# Patient Record
Sex: FEMALE | Race: OTHER | Employment: UNEMPLOYED
[De-identification: names, ages, dates, MRNs, and addresses within clinical notes are randomized per-mention and may not be internally consistent; named-entity substitution may affect disease eponyms.]

---

## 2023-12-04 ENCOUNTER — APPOINTMENT (OUTPATIENT)
Facility: HOSPITAL | Age: 68
End: 2023-12-04
Payer: MEDICARE

## 2023-12-04 ENCOUNTER — HOSPITAL ENCOUNTER (INPATIENT)
Facility: HOSPITAL | Age: 68
LOS: 5 days | Discharge: HOME | End: 2023-12-09
Attending: EMERGENCY MEDICINE | Admitting: INTERNAL MEDICINE
Payer: MEDICARE

## 2023-12-04 DIAGNOSIS — N17.9 ACUTE RENAL FAILURE SUPERIMPOSED ON STAGE 4 CHRONIC KIDNEY DISEASE, UNSPECIFIED ACUTE RENAL FAILURE TYPE (HCC): Primary | ICD-10-CM

## 2023-12-04 DIAGNOSIS — N18.4 ACUTE RENAL FAILURE SUPERIMPOSED ON STAGE 4 CHRONIC KIDNEY DISEASE, UNSPECIFIED ACUTE RENAL FAILURE TYPE (HCC): Primary | ICD-10-CM

## 2023-12-04 PROBLEM — N19 UREMIA: Status: ACTIVE | Noted: 2023-12-04

## 2023-12-04 PROBLEM — E11.8 DISORDER ASSOCIATED WITH TYPE 2 DIABETES MELLITUS (HCC): Status: ACTIVE | Noted: 2023-12-04

## 2023-12-04 PROBLEM — J30.9 ALLERGIC RHINITIS: Status: ACTIVE | Noted: 2023-12-04

## 2023-12-04 PROBLEM — I10 ESSENTIAL HYPERTENSION: Status: ACTIVE | Noted: 2023-12-04

## 2023-12-04 PROBLEM — E11.9 DIABETES MELLITUS (HCC): Status: ACTIVE | Noted: 2023-12-04

## 2023-12-04 PROBLEM — M19.91 PRIMARY LOCALIZED OSTEOARTHRITIS: Status: ACTIVE | Noted: 2023-12-04

## 2023-12-04 PROBLEM — N20.0 NEPHROLITHIASIS: Status: ACTIVE | Noted: 2023-12-04

## 2023-12-04 PROBLEM — E01.2 COLLOID GOITER: Status: ACTIVE | Noted: 2023-12-04

## 2023-12-04 LAB
ALBUMIN SERPL-MCNC: 3.7 G/DL (ref 3.4–5)
ALBUMIN/GLOB SERPL: 1.1 (ref 0.8–1.7)
ALP SERPL-CCNC: 48 U/L (ref 45–117)
ALT SERPL-CCNC: 9 U/L (ref 13–56)
ANION GAP SERPL CALC-SCNC: 11 MMOL/L (ref 3–18)
AST SERPL-CCNC: 12 U/L (ref 10–38)
BASOPHILS # BLD: 0.1 K/UL (ref 0–0.1)
BASOPHILS NFR BLD: 0 % (ref 0–2)
BILIRUB SERPL-MCNC: 0.4 MG/DL (ref 0.2–1)
BUN SERPL-MCNC: 64 MG/DL (ref 7–18)
BUN/CREAT SERPL: 11 (ref 12–20)
CALCIUM SERPL-MCNC: 9.9 MG/DL (ref 8.5–10.1)
CHLORIDE SERPL-SCNC: 105 MMOL/L (ref 100–111)
CO2 SERPL-SCNC: 23 MMOL/L (ref 21–32)
CREAT SERPL-MCNC: 5.66 MG/DL (ref 0.6–1.3)
DIFFERENTIAL METHOD BLD: ABNORMAL
EOSINOPHIL # BLD: 0.6 K/UL (ref 0–0.4)
EOSINOPHIL NFR BLD: 5 % (ref 0–5)
ERYTHROCYTE [DISTWIDTH] IN BLOOD BY AUTOMATED COUNT: 12.2 % (ref 11.6–14.5)
GLOBULIN SER CALC-MCNC: 3.4 G/DL (ref 2–4)
GLUCOSE SERPL-MCNC: 163 MG/DL (ref 74–99)
HCT VFR BLD AUTO: 32.9 % (ref 35–45)
HGB BLD-MCNC: 11 G/DL (ref 12–16)
IMM GRANULOCYTES # BLD AUTO: 0 K/UL (ref 0–0.04)
IMM GRANULOCYTES NFR BLD AUTO: 0 % (ref 0–0.5)
LYMPHOCYTES # BLD: 3.3 K/UL (ref 0.9–3.6)
LYMPHOCYTES NFR BLD: 27 % (ref 21–52)
MCH RBC QN AUTO: 31.1 PG (ref 24–34)
MCHC RBC AUTO-ENTMCNC: 33.4 G/DL (ref 31–37)
MCV RBC AUTO: 92.9 FL (ref 78–100)
MONOCYTES # BLD: 0.8 K/UL (ref 0.05–1.2)
MONOCYTES NFR BLD: 7 % (ref 3–10)
NEUTS SEG # BLD: 7.3 K/UL (ref 1.8–8)
NEUTS SEG NFR BLD: 61 % (ref 40–73)
NRBC # BLD: 0 K/UL (ref 0–0.01)
NRBC BLD-RTO: 0 PER 100 WBC
PLATELET # BLD AUTO: 329 K/UL (ref 135–420)
PMV BLD AUTO: 10.1 FL (ref 9.2–11.8)
POTASSIUM SERPL-SCNC: 3.9 MMOL/L (ref 3.5–5.5)
PROT SERPL-MCNC: 7.1 G/DL (ref 6.4–8.2)
RBC # BLD AUTO: 3.54 M/UL (ref 4.2–5.3)
SODIUM SERPL-SCNC: 139 MMOL/L (ref 136–145)
WBC # BLD AUTO: 12 K/UL (ref 4.6–13.2)

## 2023-12-04 PROCEDURE — 82746 ASSAY OF FOLIC ACID SERUM: CPT

## 2023-12-04 PROCEDURE — 99285 EMERGENCY DEPT VISIT HI MDM: CPT

## 2023-12-04 PROCEDURE — 71045 X-RAY EXAM CHEST 1 VIEW: CPT

## 2023-12-04 PROCEDURE — 82607 VITAMIN B-12: CPT

## 2023-12-04 PROCEDURE — 83550 IRON BINDING TEST: CPT

## 2023-12-04 PROCEDURE — 2500000003 HC RX 250 WO HCPCS: Performed by: EMERGENCY MEDICINE

## 2023-12-04 PROCEDURE — 1100000003 HC PRIVATE W/ TELEMETRY

## 2023-12-04 PROCEDURE — 2580000003 HC RX 258: Performed by: INTERNAL MEDICINE

## 2023-12-04 PROCEDURE — 85025 COMPLETE CBC W/AUTO DIFF WBC: CPT

## 2023-12-04 PROCEDURE — 6370000000 HC RX 637 (ALT 250 FOR IP): Performed by: INTERNAL MEDICINE

## 2023-12-04 PROCEDURE — 83540 ASSAY OF IRON: CPT

## 2023-12-04 PROCEDURE — 96374 THER/PROPH/DIAG INJ IV PUSH: CPT

## 2023-12-04 PROCEDURE — 80053 COMPREHEN METABOLIC PANEL: CPT

## 2023-12-04 PROCEDURE — 83036 HEMOGLOBIN GLYCOSYLATED A1C: CPT

## 2023-12-04 RX ORDER — SODIUM CHLORIDE 9 MG/ML
INJECTION, SOLUTION INTRAVENOUS PRN
Status: DISCONTINUED | OUTPATIENT
Start: 2023-12-04 | End: 2023-12-09 | Stop reason: HOSPADM

## 2023-12-04 RX ORDER — HEPARIN SODIUM 5000 [USP'U]/ML
5000 INJECTION, SOLUTION INTRAVENOUS; SUBCUTANEOUS EVERY 8 HOURS SCHEDULED
Status: DISCONTINUED | OUTPATIENT
Start: 2023-12-04 | End: 2023-12-05

## 2023-12-04 RX ORDER — BUMETANIDE 0.25 MG/ML
1 INJECTION INTRAMUSCULAR; INTRAVENOUS
Status: COMPLETED | OUTPATIENT
Start: 2023-12-04 | End: 2023-12-04

## 2023-12-04 RX ORDER — BUMETANIDE 0.25 MG/ML
1 INJECTION INTRAMUSCULAR; INTRAVENOUS EVERY 12 HOURS
Status: DISCONTINUED | OUTPATIENT
Start: 2023-12-05 | End: 2023-12-06

## 2023-12-04 RX ORDER — POLYETHYLENE GLYCOL 3350 17 G/17G
17 POWDER, FOR SOLUTION ORAL DAILY PRN
Status: DISCONTINUED | OUTPATIENT
Start: 2023-12-04 | End: 2023-12-09 | Stop reason: HOSPADM

## 2023-12-04 RX ORDER — ONDANSETRON 2 MG/ML
4 INJECTION INTRAMUSCULAR; INTRAVENOUS EVERY 6 HOURS PRN
Status: DISCONTINUED | OUTPATIENT
Start: 2023-12-04 | End: 2023-12-09 | Stop reason: HOSPADM

## 2023-12-04 RX ORDER — ACETAMINOPHEN 650 MG/1
650 SUPPOSITORY RECTAL EVERY 6 HOURS PRN
Status: DISCONTINUED | OUTPATIENT
Start: 2023-12-04 | End: 2023-12-09 | Stop reason: HOSPADM

## 2023-12-04 RX ORDER — INSULIN LISPRO 100 [IU]/ML
0.05 INJECTION, SOLUTION INTRAVENOUS; SUBCUTANEOUS
Status: DISCONTINUED | OUTPATIENT
Start: 2023-12-05 | End: 2023-12-05

## 2023-12-04 RX ORDER — SODIUM CHLORIDE 0.9 % (FLUSH) 0.9 %
5-40 SYRINGE (ML) INJECTION EVERY 12 HOURS SCHEDULED
Status: DISCONTINUED | OUTPATIENT
Start: 2023-12-04 | End: 2023-12-09 | Stop reason: HOSPADM

## 2023-12-04 RX ORDER — AMLODIPINE BESYLATE 5 MG/1
10 TABLET ORAL
Status: DISCONTINUED | OUTPATIENT
Start: 2023-12-04 | End: 2023-12-04

## 2023-12-04 RX ORDER — SODIUM CHLORIDE 0.9 % (FLUSH) 0.9 %
5-40 SYRINGE (ML) INJECTION PRN
Status: DISCONTINUED | OUTPATIENT
Start: 2023-12-04 | End: 2023-12-09 | Stop reason: HOSPADM

## 2023-12-04 RX ORDER — ACETAMINOPHEN 325 MG/1
650 TABLET ORAL EVERY 6 HOURS PRN
Status: DISCONTINUED | OUTPATIENT
Start: 2023-12-04 | End: 2023-12-09 | Stop reason: HOSPADM

## 2023-12-04 RX ORDER — DEXTROSE MONOHYDRATE 100 MG/ML
INJECTION, SOLUTION INTRAVENOUS CONTINUOUS PRN
Status: DISCONTINUED | OUTPATIENT
Start: 2023-12-04 | End: 2023-12-09 | Stop reason: HOSPADM

## 2023-12-04 RX ORDER — ATENOLOL 50 MG/1
50 TABLET ORAL 2 TIMES DAILY
Status: ON HOLD | COMMUNITY
End: 2023-12-09 | Stop reason: HOSPADM

## 2023-12-04 RX ORDER — ATENOLOL 50 MG/1
50 TABLET ORAL 2 TIMES DAILY
Status: DISCONTINUED | OUTPATIENT
Start: 2023-12-04 | End: 2023-12-09 | Stop reason: HOSPADM

## 2023-12-04 RX ORDER — ONDANSETRON 4 MG/1
4 TABLET, ORALLY DISINTEGRATING ORAL EVERY 8 HOURS PRN
Status: DISCONTINUED | OUTPATIENT
Start: 2023-12-04 | End: 2023-12-09 | Stop reason: HOSPADM

## 2023-12-04 RX ADMIN — SODIUM CHLORIDE, PRESERVATIVE FREE 10 ML: 5 INJECTION INTRAVENOUS at 23:04

## 2023-12-04 RX ADMIN — ATENOLOL 50 MG: 50 TABLET ORAL at 23:04

## 2023-12-04 RX ADMIN — BUMETANIDE 1 MG: 0.25 INJECTION INTRAMUSCULAR; INTRAVENOUS at 17:26

## 2023-12-04 ASSESSMENT — PAIN - FUNCTIONAL ASSESSMENT: PAIN_FUNCTIONAL_ASSESSMENT: NONE - DENIES PAIN

## 2023-12-04 NOTE — ED PROVIDER NOTES
THE [facility] EMERGENCY DEPT  EMERGENCY DEPARTMENT HISTORY AND PHYSICAL EXAM      Date: 12/4/2023  Patient Name: Jen Vogt  MRN: [id number]  [address] [place] 1955  Date of evaluation: 12/4/2023  Provider: Saint Hang, MD   Note Started: 5:39 PM EST 12/4/23    HISTORY OF PRESENT ILLNESS     Chief Complaint   Patient presents with    Chronic Kidney Disease       History Provided By: Patient    HPI: Jen Vogt is a 79 y.o. female sent by her nephrologist for worsening renal failure. Per patient as well as Dr. Jazmyne Dailey who sent patient in, patient has been having worsening kidney function for about a year. Has been followed. States that she has been drinking given fluid is mostly drinking and making good urine. Denies any pain, dysuria, hematuria, retention, fevers. Was told that she would need admission for dialysis    PAST MEDICAL HISTORY   Past Medical History:  No past medical history on file. Past Surgical History:  No past surgical history on file. Family History:  No family history on file. Social History: Allergies: Allergies   Allergen Reactions    Metformin And Related Other (See Comments)     \"Lactic acidosis\"       PCP: No primary care provider on file. Current Meds:   No current facility-administered medications for this encounter. No current outpatient medications on file. Social Determinants of Health:   Social Determinants of Health     Tobacco Use: Not on file   Alcohol Use: Not on file   Financial Resource Strain: Not on file   Food Insecurity: Not on file   Transportation Needs: Not on file   Physical Activity: Not on file   Stress: Not on file   Social Connections: Not on file   Intimate Partner Violence: Not on file   Depression: Not on file   Housing Stability: Not on file   Interpersonal Safety: Not on file   Utilities: Not on file       PHYSICAL EXAM   Physical Exam  Vitals and nursing note reviewed. Constitutional:       Appearance: Normal appearance.    HENT:

## 2023-12-04 NOTE — ED TRIAGE NOTES
Pt wheeled to triage c/o being sent by Dr. Marii Cee on the 3rd floor for admission to start dialysis. Pt does not have any HD access at this time.

## 2023-12-05 ENCOUNTER — APPOINTMENT (OUTPATIENT)
Facility: HOSPITAL | Age: 68
End: 2023-12-05
Payer: MEDICARE

## 2023-12-05 PROBLEM — Z99.2 CKD (CHRONIC KIDNEY DISEASE) STAGE V REQUIRING CHRONIC DIALYSIS (HCC): Status: ACTIVE | Noted: 2023-12-05

## 2023-12-05 PROBLEM — N18.6 CKD (CHRONIC KIDNEY DISEASE) STAGE V REQUIRING CHRONIC DIALYSIS (HCC): Status: ACTIVE | Noted: 2023-12-05

## 2023-12-05 LAB
25(OH)D3 SERPL-MCNC: 42.2 NG/ML (ref 30–100)
ALBUMIN SERPL-MCNC: 3.3 G/DL (ref 3.4–5)
ALBUMIN/GLOB SERPL: 1 (ref 0.8–1.7)
ALP SERPL-CCNC: 45 U/L (ref 45–117)
ALT SERPL-CCNC: 10 U/L (ref 13–56)
ANION GAP SERPL CALC-SCNC: 11 MMOL/L (ref 3–18)
APPEARANCE UR: ABNORMAL
AST SERPL-CCNC: 9 U/L (ref 10–38)
BACTERIA URNS QL MICRO: ABNORMAL /HPF
BILIRUB SERPL-MCNC: 0.3 MG/DL (ref 0.2–1)
BILIRUB UR QL: NEGATIVE
BUN SERPL-MCNC: 68 MG/DL (ref 7–18)
BUN/CREAT SERPL: 12 (ref 12–20)
CALCIUM SERPL-MCNC: 9.3 MG/DL (ref 8.5–10.1)
CALCIUM SERPL-MCNC: 9.5 MG/DL (ref 8.5–10.1)
CHLORIDE SERPL-SCNC: 107 MMOL/L (ref 100–111)
CO2 SERPL-SCNC: 23 MMOL/L (ref 21–32)
COLOR UR: YELLOW
CREAT SERPL-MCNC: 5.58 MG/DL (ref 0.6–1.3)
EKG ATRIAL RATE: 84 BPM
EKG DIAGNOSIS: NORMAL
EKG P AXIS: 37 DEGREES
EKG P-R INTERVAL: 286 MS
EKG Q-T INTERVAL: 404 MS
EKG QRS DURATION: 84 MS
EKG QTC CALCULATION (BAZETT): 477 MS
EKG R AXIS: 37 DEGREES
EKG T AXIS: 78 DEGREES
EKG VENTRICULAR RATE: 84 BPM
EPITH CASTS URNS QL MICRO: ABNORMAL /LPF (ref 0–5)
ERYTHROCYTE [DISTWIDTH] IN BLOOD BY AUTOMATED COUNT: 12.2 % (ref 11.6–14.5)
EST. AVERAGE GLUCOSE BLD GHB EST-MCNC: 169 MG/DL
FOLATE SERPL-MCNC: 18 NG/ML (ref 3.1–17.5)
GLOBULIN SER CALC-MCNC: 3.2 G/DL (ref 2–4)
GLUCOSE BLD STRIP.AUTO-MCNC: 163 MG/DL (ref 70–110)
GLUCOSE BLD STRIP.AUTO-MCNC: 172 MG/DL (ref 70–110)
GLUCOSE BLD STRIP.AUTO-MCNC: 182 MG/DL (ref 70–110)
GLUCOSE SERPL-MCNC: 178 MG/DL (ref 74–99)
GLUCOSE UR STRIP.AUTO-MCNC: NEGATIVE MG/DL
HBA1C MFR BLD: 7.5 % (ref 4.2–5.6)
HCT VFR BLD AUTO: 30.1 % (ref 35–45)
HGB BLD-MCNC: 10.3 G/DL (ref 12–16)
HGB UR QL STRIP: NEGATIVE
INR PPP: 1 (ref 0.9–1.1)
IRON SATN MFR SERPL: 30 % (ref 20–50)
IRON SERPL-MCNC: 71 UG/DL (ref 50–175)
KETONES UR QL STRIP.AUTO: NEGATIVE MG/DL
LEUKOCYTE ESTERASE UR QL STRIP.AUTO: NEGATIVE
MAGNESIUM SERPL-MCNC: 2.5 MG/DL (ref 1.6–2.6)
MCH RBC QN AUTO: 31.3 PG (ref 24–34)
MCHC RBC AUTO-ENTMCNC: 34.2 G/DL (ref 31–37)
MCV RBC AUTO: 91.5 FL (ref 78–100)
NITRITE UR QL STRIP.AUTO: NEGATIVE
NRBC # BLD: 0 K/UL (ref 0–0.01)
NRBC BLD-RTO: 0 PER 100 WBC
PH UR STRIP: 5.5 (ref 5–8)
PHOSPHATE SERPL-MCNC: 6.3 MG/DL (ref 2.5–4.9)
PLATELET # BLD AUTO: 331 K/UL (ref 135–420)
PMV BLD AUTO: 10.7 FL (ref 9.2–11.8)
POTASSIUM SERPL-SCNC: 3.5 MMOL/L (ref 3.5–5.5)
PROT SERPL-MCNC: 6.5 G/DL (ref 6.4–8.2)
PROT UR STRIP-MCNC: 300 MG/DL
PROTHROMBIN TIME: 13.5 SEC (ref 11.9–14.7)
PTH-INTACT SERPL-MCNC: 151.6 PG/ML (ref 18.4–88)
RBC # BLD AUTO: 3.29 M/UL (ref 4.2–5.3)
RBC #/AREA URNS HPF: ABNORMAL /HPF (ref 0–5)
SODIUM SERPL-SCNC: 141 MMOL/L (ref 136–145)
SP GR UR REFRACTOMETRY: 1.01 (ref 1–1.03)
TIBC SERPL-MCNC: 237 UG/DL (ref 250–450)
TROPONIN I SERPL HS-MCNC: 12 NG/L (ref 0–54)
TROPONIN I SERPL HS-MCNC: 13 NG/L (ref 0–54)
TROPONIN I SERPL HS-MCNC: 18 NG/L (ref 0–54)
TSH SERPL DL<=0.05 MIU/L-ACNC: 1.48 UIU/ML (ref 0.36–3.74)
UROBILINOGEN UR QL STRIP.AUTO: 0.2 EU/DL (ref 0.2–1)
VIT B12 SERPL-MCNC: 822 PG/ML (ref 211–911)
WBC # BLD AUTO: 9.6 K/UL (ref 4.6–13.2)
WBC URNS QL MICRO: ABNORMAL /HPF (ref 0–5)

## 2023-12-05 PROCEDURE — 6370000000 HC RX 637 (ALT 250 FOR IP): Performed by: HOSPITALIST

## 2023-12-05 PROCEDURE — 2580000003 HC RX 258: Performed by: INTERNAL MEDICINE

## 2023-12-05 PROCEDURE — 97162 PT EVAL MOD COMPLEX 30 MIN: CPT

## 2023-12-05 PROCEDURE — 6360000002 HC RX W HCPCS

## 2023-12-05 PROCEDURE — 36415 COLL VENOUS BLD VENIPUNCTURE: CPT

## 2023-12-05 PROCEDURE — 76937 US GUIDE VASCULAR ACCESS: CPT

## 2023-12-05 PROCEDURE — 87340 HEPATITIS B SURFACE AG IA: CPT

## 2023-12-05 PROCEDURE — 2500000003 HC RX 250 WO HCPCS

## 2023-12-05 PROCEDURE — 2580000003 HC RX 258: Performed by: STUDENT IN AN ORGANIZED HEALTH CARE EDUCATION/TRAINING PROGRAM

## 2023-12-05 PROCEDURE — 2500000003 HC RX 250 WO HCPCS: Performed by: INTERNAL MEDICINE

## 2023-12-05 PROCEDURE — 83735 ASSAY OF MAGNESIUM: CPT

## 2023-12-05 PROCEDURE — 83970 ASSAY OF PARATHORMONE: CPT

## 2023-12-05 PROCEDURE — 5A1D70Z PERFORMANCE OF URINARY FILTRATION, INTERMITTENT, LESS THAN 6 HOURS PER DAY: ICD-10-PCS | Performed by: STUDENT IN AN ORGANIZED HEALTH CARE EDUCATION/TRAINING PROGRAM

## 2023-12-05 PROCEDURE — 84100 ASSAY OF PHOSPHORUS: CPT

## 2023-12-05 PROCEDURE — 02H633Z INSERTION OF INFUSION DEVICE INTO RIGHT ATRIUM, PERCUTANEOUS APPROACH: ICD-10-PCS | Performed by: STUDENT IN AN ORGANIZED HEALTH CARE EDUCATION/TRAINING PROGRAM

## 2023-12-05 PROCEDURE — 84443 ASSAY THYROID STIM HORMONE: CPT

## 2023-12-05 PROCEDURE — 0JH63XZ INSERTION OF TUNNELED VASCULAR ACCESS DEVICE INTO CHEST SUBCUTANEOUS TISSUE AND FASCIA, PERCUTANEOUS APPROACH: ICD-10-PCS | Performed by: STUDENT IN AN ORGANIZED HEALTH CARE EDUCATION/TRAINING PROGRAM

## 2023-12-05 PROCEDURE — 86803 HEPATITIS C AB TEST: CPT

## 2023-12-05 PROCEDURE — 6360000002 HC RX W HCPCS: Performed by: STUDENT IN AN ORGANIZED HEALTH CARE EDUCATION/TRAINING PROGRAM

## 2023-12-05 PROCEDURE — 1100000003 HC PRIVATE W/ TELEMETRY

## 2023-12-05 PROCEDURE — 84484 ASSAY OF TROPONIN QUANT: CPT

## 2023-12-05 PROCEDURE — 99155 MOD SED OTH PHYS/QHP <5 YRS: CPT

## 2023-12-05 PROCEDURE — 82962 GLUCOSE BLOOD TEST: CPT

## 2023-12-05 PROCEDURE — 85610 PROTHROMBIN TIME: CPT

## 2023-12-05 PROCEDURE — 81001 URINALYSIS AUTO W/SCOPE: CPT

## 2023-12-05 PROCEDURE — 6360000002 HC RX W HCPCS: Performed by: INTERNAL MEDICINE

## 2023-12-05 PROCEDURE — 82306 VITAMIN D 25 HYDROXY: CPT

## 2023-12-05 PROCEDURE — 6370000000 HC RX 637 (ALT 250 FOR IP): Performed by: INTERNAL MEDICINE

## 2023-12-05 PROCEDURE — 80053 COMPREHEN METABOLIC PANEL: CPT

## 2023-12-05 PROCEDURE — 6370000000 HC RX 637 (ALT 250 FOR IP): Performed by: STUDENT IN AN ORGANIZED HEALTH CARE EDUCATION/TRAINING PROGRAM

## 2023-12-05 PROCEDURE — 85027 COMPLETE CBC AUTOMATED: CPT

## 2023-12-05 RX ORDER — LIDOCAINE HYDROCHLORIDE 10 MG/ML
20 INJECTION, SOLUTION INFILTRATION; PERINEURAL ONCE
Status: DISCONTINUED | OUTPATIENT
Start: 2023-12-05 | End: 2023-12-09 | Stop reason: HOSPADM

## 2023-12-05 RX ORDER — HEPARIN SODIUM 200 [USP'U]/100ML
INJECTION, SOLUTION INTRAVENOUS CONTINUOUS PRN
Status: COMPLETED | OUTPATIENT
Start: 2023-12-05 | End: 2023-12-05

## 2023-12-05 RX ORDER — AMLODIPINE BESYLATE 5 MG/1
10 TABLET ORAL DAILY
Status: DISCONTINUED | OUTPATIENT
Start: 2023-12-05 | End: 2023-12-06

## 2023-12-05 RX ORDER — LIDOCAINE HYDROCHLORIDE 10 MG/ML
INJECTION, SOLUTION EPIDURAL; INFILTRATION; INTRACAUDAL; PERINEURAL PRN
Status: COMPLETED | OUTPATIENT
Start: 2023-12-05 | End: 2023-12-05

## 2023-12-05 RX ORDER — INSULIN LISPRO 100 [IU]/ML
0-8 INJECTION, SOLUTION INTRAVENOUS; SUBCUTANEOUS
Status: DISCONTINUED | OUTPATIENT
Start: 2023-12-05 | End: 2023-12-09 | Stop reason: HOSPADM

## 2023-12-05 RX ORDER — LIDOCAINE HYDROCHLORIDE AND EPINEPHRINE BITARTRATE 20; .01 MG/ML; MG/ML
INJECTION, SOLUTION SUBCUTANEOUS PRN
Status: COMPLETED | OUTPATIENT
Start: 2023-12-05 | End: 2023-12-05

## 2023-12-05 RX ORDER — ONDANSETRON 2 MG/ML
INJECTION INTRAMUSCULAR; INTRAVENOUS PRN
Status: COMPLETED | OUTPATIENT
Start: 2023-12-05 | End: 2023-12-05

## 2023-12-05 RX ORDER — HEPARIN SODIUM 1000 [USP'U]/ML
INJECTION, SOLUTION INTRAVENOUS; SUBCUTANEOUS PRN
Status: COMPLETED | OUTPATIENT
Start: 2023-12-05 | End: 2023-12-05

## 2023-12-05 RX ORDER — FAMOTIDINE 20 MG/1
20 TABLET, FILM COATED ORAL EVERY 24 HOURS
Status: DISCONTINUED | OUTPATIENT
Start: 2023-12-05 | End: 2023-12-09 | Stop reason: HOSPADM

## 2023-12-05 RX ORDER — LIDOCAINE HYDROCHLORIDE AND EPINEPHRINE 10; 10 MG/ML; UG/ML
20 INJECTION, SOLUTION INFILTRATION; PERINEURAL ONCE
Status: DISCONTINUED | OUTPATIENT
Start: 2023-12-05 | End: 2023-12-09 | Stop reason: HOSPADM

## 2023-12-05 RX ORDER — HEPARIN SODIUM 200 [USP'U]/100ML
1000 INJECTION, SOLUTION INTRAVENOUS ONCE
Status: DISCONTINUED | OUTPATIENT
Start: 2023-12-05 | End: 2023-12-09 | Stop reason: HOSPADM

## 2023-12-05 RX ORDER — HEPARIN SODIUM 1000 [USP'U]/ML
4000 INJECTION, SOLUTION INTRAVENOUS; SUBCUTANEOUS
Status: COMPLETED | OUTPATIENT
Start: 2023-12-05 | End: 2023-12-09

## 2023-12-05 RX ORDER — FENTANYL CITRATE 50 UG/ML
INJECTION, SOLUTION INTRAMUSCULAR; INTRAVENOUS PRN
Status: COMPLETED | OUTPATIENT
Start: 2023-12-05 | End: 2023-12-05

## 2023-12-05 RX ORDER — HYDRALAZINE HYDROCHLORIDE 10 MG/1
10 TABLET, FILM COATED ORAL EVERY 8 HOURS SCHEDULED
Status: DISCONTINUED | OUTPATIENT
Start: 2023-12-05 | End: 2023-12-06

## 2023-12-05 RX ORDER — MIDAZOLAM HYDROCHLORIDE 2 MG/2ML
INJECTION, SOLUTION INTRAMUSCULAR; INTRAVENOUS PRN
Status: COMPLETED | OUTPATIENT
Start: 2023-12-05 | End: 2023-12-05

## 2023-12-05 RX ORDER — INSULIN LISPRO 100 [IU]/ML
0-4 INJECTION, SOLUTION INTRAVENOUS; SUBCUTANEOUS NIGHTLY
Status: DISCONTINUED | OUTPATIENT
Start: 2023-12-05 | End: 2023-12-09 | Stop reason: HOSPADM

## 2023-12-05 RX ORDER — HEPARIN SODIUM 5000 [USP'U]/ML
5000 INJECTION, SOLUTION INTRAVENOUS; SUBCUTANEOUS EVERY 8 HOURS SCHEDULED
Status: DISCONTINUED | OUTPATIENT
Start: 2023-12-06 | End: 2023-12-09 | Stop reason: HOSPADM

## 2023-12-05 RX ADMIN — ATENOLOL 50 MG: 50 TABLET ORAL at 08:56

## 2023-12-05 RX ADMIN — SODIUM CHLORIDE, PRESERVATIVE FREE 10 ML: 5 INJECTION INTRAVENOUS at 21:30

## 2023-12-05 RX ADMIN — BUMETANIDE 1 MG: 0.25 INJECTION INTRAMUSCULAR; INTRAVENOUS at 16:43

## 2023-12-05 RX ADMIN — HEPARIN SODIUM 500 ML: 200 INJECTION, SOLUTION INTRAVENOUS at 12:17

## 2023-12-05 RX ADMIN — ONDANSETRON 4 MG: 2 INJECTION INTRAMUSCULAR; INTRAVENOUS at 21:38

## 2023-12-05 RX ADMIN — AMLODIPINE BESYLATE 10 MG: 5 TABLET ORAL at 16:43

## 2023-12-05 RX ADMIN — LIDOCAINE HYDROCHLORIDE AND EPINEPHRINE 10 ML: 20; 10 INJECTION, SOLUTION INFILTRATION; PERINEURAL at 12:29

## 2023-12-05 RX ADMIN — HYDRALAZINE HYDROCHLORIDE 10 MG: 10 TABLET, FILM COATED ORAL at 13:24

## 2023-12-05 RX ADMIN — FENTANYL CITRATE 25 MCG: 50 INJECTION, SOLUTION INTRAMUSCULAR; INTRAVENOUS at 12:11

## 2023-12-05 RX ADMIN — SODIUM CHLORIDE, PRESERVATIVE FREE 10 ML: 5 INJECTION INTRAVENOUS at 08:57

## 2023-12-05 RX ADMIN — FENTANYL CITRATE 50 MCG: 50 INJECTION, SOLUTION INTRAMUSCULAR; INTRAVENOUS at 11:55

## 2023-12-05 RX ADMIN — LIDOCAINE HYDROCHLORIDE 2 ML: 10 INJECTION, SOLUTION EPIDURAL; INFILTRATION; INTRACAUDAL; PERINEURAL at 12:28

## 2023-12-05 RX ADMIN — CEFAZOLIN 2000 MG: 1 INJECTION, POWDER, FOR SOLUTION INTRAMUSCULAR; INTRAVENOUS at 11:37

## 2023-12-05 RX ADMIN — HEPARIN SODIUM 4000 UNITS: 1000 INJECTION INTRAVENOUS; SUBCUTANEOUS at 12:30

## 2023-12-05 RX ADMIN — MIDAZOLAM HYDROCHLORIDE 1 MG: 1 INJECTION, SOLUTION INTRAMUSCULAR; INTRAVENOUS at 11:55

## 2023-12-05 RX ADMIN — ONDANSETRON HYDROCHLORIDE 4 MG: 2 INJECTION INTRAMUSCULAR; INTRAVENOUS at 12:14

## 2023-12-05 NOTE — PROGRESS NOTES
Renal Dosing Adjustment     The following medication: Famotidine was automatically dose-adjusted per [facility] [employer] approved Protocols, with respect to renal function. Pt Weight:   Wt Readings from Last 1 Encounters:   12/04/23 59.4 kg (131 lb)       Previous Regimen                                            Famotidine 20 mg PO twice daily   Creatinine Clearance Estimated Creatinine Clearance: 9 mL/min (A) (based on SCr of 5.58 mg/dL (H)). BUN Lab Results   Component Value Date/Time    BUN 68 12/05/2023 04:03 AM         Assessment/Plan  Dose adjusted to Famotidine 20 mg PO once daily d/t indication of HD. Doses scheduled for 2100 (post dialysis). Pharmacy to continue to monitor patient daily. Will make dosage adjustments based upon changing renal function.     Signed Felipe Hanson, [address]  Contact: [phone]

## 2023-12-05 NOTE — ED NOTES
TRANSFER - OUT REPORT:    Verbal report given to Radha Hargrove RN on Анна Khan  being transferred to [facility] for routine progression of patient care       Report consisted of patient's Situation, Background, Assessment and   Recommendations(SBAR). Information from the following report(s) Nurse Handoff Report, Index, ED Encounter Summary, ED SBAR, MAR, and Recent Results was reviewed with the receiving nurse. [place] Fall Assessment:    Presents to emergency department  because of falls (Syncope, seizure, or loss of consciousness): No  Age > 70: No  Altered Mental Status, Intoxication with alcohol or substance confusion (Disorientation, impaired judgment, poor safety awaremess, or inability to follow instructions): No     Nursing Judgement: No          Lines:   Peripheral IV 12/04/23 Left Antecubital (Active)   Site Assessment Clean, dry & intact 12/04/23 1707   Line Status Blood return noted 12/04/23 [address] changed 12/04/23 1707   Phlebitis Assessment No symptoms 12/04/23 1707   Infiltration Assessment 0 12/04/23 1707        Opportunity for questions and clarification was provided.       Patient transported with:  Registered Nurse          Villa Anguiano RN  12/04/23 0931

## 2023-12-05 NOTE — PROGRESS NOTES
TRANSFER - IN REPORT:    Verbal report received from Gabriela Lambert RN on Deleta Slay  being received from ER for routine progression of patient care      Report consisted of patient's Situation, Background, Assessment and   Recommendations(SBAR). Information from the following report(s) Nurse Handoff Report, ED SBAR, Intake/Output, MAR, Recent Results, and Med Rec Status was reviewed with the receiving nurse. Opportunity for questions and clarification was provided. Assessment completed upon patient's arrival to unit and care assumed.

## 2023-12-05 NOTE — PROGRESS NOTES
TRANSFER - OUT REPORT:    Verbal report given to kamari shaw(name) on Dany Zhou being transferred to [facility](unit) for routine progression of patient care       Report consisted of patient's Situation, Background, Assessment and   Recommendations(SBAR). Information from the following report(s) Med Rec Status and Event Log was reviewed with the receiving nurse. Opportunity for questions and clarification was provided.       Patient transported with:   Adype

## 2023-12-05 NOTE — H&P
History & Physical    Patient: Princess Bermudez MRN: [id number]  CSN: [id number]    YOB: 1955  Age: 79 y.o. Sex: female      DOA: 12/4/2023    Chief Complaint:   Chief Complaint   Patient presents with    Chronic Kidney Disease       Active Hospital Problems    Diagnosis Date Noted    Uremia [N19] 12/04/2023    Diabetes mellitus ([address]) [E11.9] 12/04/2023    Colloid goiter [E01.2] 12/04/2023          HPI:     Princess Bermudez is a 79 y.o.  female who has hx of stage v kidney disease  Present to /er at urging of nephrology doctor due to symptoms of   Fatigue, nausea, vomiting,tremor and generalized malaise  Patient moved to [place] from [place] to be closer to children prior to starting dialysis. Patient was initially schedule to start diallyls in May  Patient was sent directly to ER from Nephrogist appointment  Failure of kidney worsened in last 2 years after initiating Jardiace      Past Medical History:   Diagnosis Date    CKD (chronic kidney disease)     DM (diabetes mellitus) ([address])     HTN (hypertension)        No past surgical history on file. Family History   Problem Relation Age of Onset    Diabetes Mother        Social History     Socioeconomic History    Marital status: Single     Spouse name: None    Number of children: None    Years of education: None    Highest education level: None   Tobacco Use    Smoking status: Never   Substance and Sexual Activity    Alcohol use: Never    Drug use: Never       Prior to Admission medications    Medication Sig Start Date End Date Taking? Authorizing Provider   atenolol (TENORMIN) 50 MG tablet Take 1 tablet by mouth in the morning and at bedtime   Yes Provider, Historical, MD       Allergies   Allergen Reactions    Metformin And Related Other (See Comments)     \"Lactic acidosis\"         Review of Systems  GENERAL: Patient alert, awake and oriented times 3, able to communicate full sentences and not in distress.    HEENT: No change in vision, no PM     12/04/2023 04:45 PM    CO2 23 12/04/2023 04:45 PM    BUN 64 12/04/2023 04:45 PM    CREATININE 5.66 12/04/2023 04:45 PM    GLUCOSE 163 12/04/2023 04:45 PM    CALCIUM 9.9 12/04/2023 04:45 PM      Last 3 BMP:   Recent Labs     12/04/23  1645      K 3.9      CO2 23   BUN 64*   CREATININE 5.66*   GLUCOSE 163*   CALCIUM 9.9    CMP:   Lab Results   Component Value Date/Time     12/04/2023 04:45 PM    K 3.9 12/04/2023 04:45 PM     12/04/2023 04:45 PM    CO2 23 12/04/2023 04:45 PM    BUN 64 12/04/2023 04:45 PM    CREATININE 5.66 12/04/2023 04:45 PM    GLUCOSE 163 12/04/2023 04:45 PM    CALCIUM 9.9 12/04/2023 04:45 PM    PROT 7.1 12/04/2023 04:45 PM    LABALBU 3.7 12/04/2023 04:45 PM    BILITOT 0.4 12/04/2023 04:45 PM    AST 12 12/04/2023 04:45 PM    ALT 9 12/04/2023 04:45 PM      Last 3 CMP:   Recent Labs     12/04/23  1645      K 3.9      CO2 23   BUN 64*   CREATININE 5.66*   GLUCOSE 163*   CALCIUM 9.9   PROT 7.1   LABALBU 3.7   BILITOT 0.4   ALKPHOS 48   AST 12   ALT 9*      Glucose:   Lab Results   Component Value Date/Time    GLUCOSE 163 12/04/2023 04:45 PM      Last 3 Glucose:   Recent Labs     12/04/23  1645   GLUCOSE 163*      POC Glucose: No results found for: \"POCGLU\"   Last 3 POC Glucose: No results for input(s): \"POCGLU\" in the last 72 hours. Last 3 CK, CKMB, Troponin: No results for input(s): \"CKTOTAL\", \"CKMB\", \"TROPONINI\" in the last 72 hours.    CBC:   Lab Results   Component Value Date/Time    WBC 12.0 12/04/2023 04:45 PM    RBC 3.54 12/04/2023 04:45 PM    HGB 11.0 12/04/2023 04:45 PM    HCT 32.9 12/04/2023 04:45 PM    MCV 92.9 12/04/2023 04:45 PM    MCH 31.1 12/04/2023 04:45 PM    MCHC 33.4 12/04/2023 04:45 PM    RDW 12.2 12/04/2023 04:45 PM     12/04/2023 04:45 PM    MPV 10.1 12/04/2023 04:45 PM      Last 3 CBC:   Recent Labs     12/04/23  1645   WBC 12.0   RBC 3.54*   HGB 11.0*   HCT 32.9*   MCV 92.9   MCH 31.1   MCHC 33.4   RDW 12.2      MPV

## 2023-12-05 NOTE — OR NURSING
Pt was educated to procedure and sedation. Pt NPO. Pt does not take anticoagulation.  Pt confirms no problems with sedation in the past.

## 2023-12-05 NOTE — PROGRESS NOTES
Interventional Radiology Brief Postoperative Note    Procedure Date:  12/5/2023    Procedure:  Ultrasound and Fluoroscopic Guided Tunneled Dialysis Catheter Placement     :  Gabriela Walter NP    Attending:  Gurjit Jeffrey MD    Preoperative Diagnosis:  CKD    Postoperative Diagnosis:  CKD    Complications:  None. Estimated Blood Loss:  <10mL    Procedure Findings:  Technically successful ultrasound and fluoroscopic guided placement of a right internal jugular vein tunneled dialysis catheter under moderate sedation. The catheter is ready for immediate use. Specimens:  None. Condition:  The patient developed intraoperative nausea, resolved with 4 mg IV Zofran. Otherwise tolerated the procedure without difficulty and remained in stable condition throughout.     Cristina Shook, APRN - [address] [facility]

## 2023-12-05 NOTE — PROGRESS NOTES
Case Management Assessment  Initial Evaluation    Date/Time of Evaluation: 12/5/2023 3:05 PM  Assessment Completed by: Oralia Pearce    If patient is discharged prior to next notation, then this note serves as note for discharge by case management. Patient Name: Leonel Sharp                   YOB: 1955  Diagnosis: Uremia [N19]  Acute renal failure superimposed on stage 4 chronic kidney disease, unspecified acute renal failure type ([address]) [N17.9, N18.4]                   Date / Time: 12/4/2023  4:19 PM    Patient Admission Status: Inpatient   Readmission Risk (Low < 19, Mod (19-27), High > 27): Readmission Risk Score: 15.2    Current PCP: Leroy De La Cruz MD  PCP verified by CM? Yes    Chart Reviewed: Yes      History Provided by: Patient  Patient Orientation: Alert and Oriented    Patient Cognition: Alert    Hospitalization in the last 30 days (Readmission):  No    If yes, Readmission Assessment in CM Navigator will be completed. Advance Directives:      Code Status: Full Code   Patient's Primary Decision Maker is:        Discharge Planning:    Patient lives with: Family Members Type of Home: House  Primary Care Giver: Self  Patient Support Systems include: Children   Current Financial resources:    Current community resources:    Current services prior to admission: None            Current DME:              Type of Home Care services:       ADLS  Prior functional level: Independent in ADLs/IADLs  Current functional level: Assistance with the following:, Mobility    PT AM-PAC:   /24  OT AM-PAC:   /24    Family can provide assistance at DC: Yes  Would you like Case Management to discuss the discharge plan with any other family members/significant others, and if so, who?  Yes (daughter)  Plans to Return to Present Housing: Yes  Other Identified Issues/Barriers to RETURNING to current housing: dialysis transport may be an issue   Potential Assistance needed at discharge: N/A            Potential

## 2023-12-05 NOTE — PROGRESS NOTES
Pt to have [address] placed in IR today. Primary RN aware. Per RAPHAEL Briggs, pt has been NPO and is oriented and able to consent to procedure.

## 2023-12-06 LAB
ALBUMIN SERPL-MCNC: 3.4 G/DL (ref 3.4–5)
ALBUMIN/GLOB SERPL: 0.9 (ref 0.8–1.7)
ALP SERPL-CCNC: 43 U/L (ref 45–117)
ALT SERPL-CCNC: <6 U/L (ref 13–56)
ANION GAP SERPL CALC-SCNC: 10 MMOL/L (ref 3–18)
AST SERPL-CCNC: 10 U/L (ref 10–38)
BILIRUB SERPL-MCNC: 0.2 MG/DL (ref 0.2–1)
BUN SERPL-MCNC: 65 MG/DL (ref 7–18)
BUN/CREAT SERPL: 11 (ref 12–20)
CALCIUM SERPL-MCNC: 9.4 MG/DL (ref 8.5–10.1)
CHLORIDE SERPL-SCNC: 107 MMOL/L (ref 100–111)
CO2 SERPL-SCNC: 24 MMOL/L (ref 21–32)
CREAT SERPL-MCNC: 5.98 MG/DL (ref 0.6–1.3)
ERYTHROCYTE [DISTWIDTH] IN BLOOD BY AUTOMATED COUNT: 12.6 % (ref 11.6–14.5)
GLOBULIN SER CALC-MCNC: 3.7 G/DL (ref 2–4)
GLUCOSE BLD STRIP.AUTO-MCNC: 108 MG/DL (ref 70–110)
GLUCOSE BLD STRIP.AUTO-MCNC: 150 MG/DL (ref 70–110)
GLUCOSE BLD STRIP.AUTO-MCNC: 151 MG/DL (ref 70–110)
GLUCOSE BLD STRIP.AUTO-MCNC: 162 MG/DL (ref 70–110)
GLUCOSE SERPL-MCNC: 182 MG/DL (ref 74–99)
HBV SURFACE AG SER QL: 0.1 INDEX
HBV SURFACE AG SER QL: NEGATIVE
HCT VFR BLD AUTO: 30 % (ref 35–45)
HCV AB SER IA-ACNC: 0.05 INDEX
HCV AB SERPL QL IA: NEGATIVE
HEPATITIS C COMMENT: NORMAL
HGB BLD-MCNC: 10.1 G/DL (ref 12–16)
MAGNESIUM SERPL-MCNC: 2.3 MG/DL (ref 1.6–2.6)
MCH RBC QN AUTO: 31.4 PG (ref 24–34)
MCHC RBC AUTO-ENTMCNC: 33.7 G/DL (ref 31–37)
MCV RBC AUTO: 93.2 FL (ref 78–100)
NRBC # BLD: 0 K/UL (ref 0–0.01)
NRBC BLD-RTO: 0 PER 100 WBC
PHOSPHATE SERPL-MCNC: 5.6 MG/DL (ref 2.5–4.9)
PLATELET # BLD AUTO: 323 K/UL (ref 135–420)
PMV BLD AUTO: 11 FL (ref 9.2–11.8)
POTASSIUM SERPL-SCNC: 3.3 MMOL/L (ref 3.5–5.5)
PROT SERPL-MCNC: 7.1 G/DL (ref 6.4–8.2)
RBC # BLD AUTO: 3.22 M/UL (ref 4.2–5.3)
SODIUM SERPL-SCNC: 141 MMOL/L (ref 136–145)
WBC # BLD AUTO: 13.5 K/UL (ref 4.6–13.2)

## 2023-12-06 PROCEDURE — 6370000000 HC RX 637 (ALT 250 FOR IP): Performed by: INTERNAL MEDICINE

## 2023-12-06 PROCEDURE — 6360000002 HC RX W HCPCS: Performed by: HOSPITALIST

## 2023-12-06 PROCEDURE — 84100 ASSAY OF PHOSPHORUS: CPT

## 2023-12-06 PROCEDURE — 82962 GLUCOSE BLOOD TEST: CPT

## 2023-12-06 PROCEDURE — 83735 ASSAY OF MAGNESIUM: CPT

## 2023-12-06 PROCEDURE — 1100000003 HC PRIVATE W/ TELEMETRY

## 2023-12-06 PROCEDURE — 90935 HEMODIALYSIS ONE EVALUATION: CPT

## 2023-12-06 PROCEDURE — 6360000002 HC RX W HCPCS: Performed by: INTERNAL MEDICINE

## 2023-12-06 PROCEDURE — 80053 COMPREHEN METABOLIC PANEL: CPT

## 2023-12-06 PROCEDURE — 2580000003 HC RX 258: Performed by: INTERNAL MEDICINE

## 2023-12-06 PROCEDURE — 6370000000 HC RX 637 (ALT 250 FOR IP): Performed by: HOSPITALIST

## 2023-12-06 PROCEDURE — 2500000003 HC RX 250 WO HCPCS: Performed by: INTERNAL MEDICINE

## 2023-12-06 PROCEDURE — 36415 COLL VENOUS BLD VENIPUNCTURE: CPT

## 2023-12-06 PROCEDURE — 85027 COMPLETE CBC AUTOMATED: CPT

## 2023-12-06 RX ORDER — BUMETANIDE 1 MG/1
1 TABLET ORAL DAILY
Status: DISCONTINUED | OUTPATIENT
Start: 2023-12-07 | End: 2023-12-09 | Stop reason: HOSPADM

## 2023-12-06 RX ADMIN — ONDANSETRON 4 MG: 2 INJECTION INTRAMUSCULAR; INTRAVENOUS at 17:13

## 2023-12-06 RX ADMIN — SODIUM CHLORIDE, PRESERVATIVE FREE 10 ML: 5 INJECTION INTRAVENOUS at 08:50

## 2023-12-06 RX ADMIN — HEPARIN SODIUM 5000 UNITS: 5000 INJECTION INTRAVENOUS; SUBCUTANEOUS at 05:50

## 2023-12-06 RX ADMIN — HEPARIN SODIUM 5000 UNITS: 5000 INJECTION INTRAVENOUS; SUBCUTANEOUS at 20:57

## 2023-12-06 RX ADMIN — HYDRALAZINE HYDROCHLORIDE 10 MG: 10 TABLET, FILM COATED ORAL at 05:50

## 2023-12-06 RX ADMIN — BUMETANIDE 1 MG: 0.25 INJECTION INTRAMUSCULAR; INTRAVENOUS at 05:50

## 2023-12-06 RX ADMIN — ONDANSETRON 4 MG: 2 INJECTION INTRAMUSCULAR; INTRAVENOUS at 08:59

## 2023-12-06 RX ADMIN — FAMOTIDINE 20 MG: 20 TABLET, FILM COATED ORAL at 02:24

## 2023-12-06 RX ADMIN — ATENOLOL 50 MG: 50 TABLET ORAL at 20:56

## 2023-12-06 RX ADMIN — FAMOTIDINE 20 MG: 20 TABLET, FILM COATED ORAL at 20:57

## 2023-12-06 RX ADMIN — SODIUM CHLORIDE, PRESERVATIVE FREE 10 ML: 5 INJECTION INTRAVENOUS at 21:07

## 2023-12-06 NOTE — PROGRESS NOTES
Received pre HD report from MARGA Silvestre RN. Pt in bed, A+O x4, no s/s of distress noted. Accessed right IJ TDC w/o complications. Tx initiated at 2235. TDC flowing with ease. For hemodynamic stability UF goal 500ml. 2345: BP drop 99/61, 100cc NS bolus given and UF turned OFF  2357:BP improved 102/62, UF remains OFF  0015: BP improved 123/66, UF turned back on    Offered assistance with repositioning every hour. Vascular access visible at all times during treatment, line connections intact at all times. Tx completed at 0037, tolerated fairly well, no fluid removed. De-accessed per protocol. Heparin indwell 1.6ml in arterial, and 1.6ml in venous catheter. Unit nurse given report.

## 2023-12-06 NOTE — PLAN OF CARE
Problem: Chronic Conditions and Co-morbidities  Goal: Patient's chronic conditions and co-morbidity symptoms are monitored and maintained or improved  12/6/2023 1349 by Marcelo Sandifer, RN  Outcome: Progressing  12/6/2023 1034 by Amari Francisco RN  Outcome: Progressing  Flowsheets (Taken 12/6/2023 0800)  Care Plan - Patient's Chronic Conditions and Co-Morbidity Symptoms are Monitored and Maintained or Improved:   Monitor and assess patient's chronic conditions and comorbid symptoms for stability, deterioration, or improvement   Collaborate with multidisciplinary team to address chronic and comorbid conditions and prevent exacerbation or deterioration   Update acute care plan with appropriate goals if chronic or comorbid symptoms are exacerbated and prevent overall improvement and discharge   Receiving 2nd treatment today

## 2023-12-06 NOTE — PLAN OF CARE
Problem: Discharge Planning  Goal: Discharge to home or other facility with appropriate resources  Outcome: Progressing  Flowsheets (Taken 12/6/2023 0800)  Discharge to home or other facility with appropriate resources:   Identify barriers to discharge with patient and caregiver   Arrange for needed discharge resources and transportation as appropriate     Problem: Chronic Conditions and Co-morbidities  Goal: Patient's chronic conditions and co-morbidity symptoms are monitored and maintained or improved  Outcome: Progressing  Flowsheets (Taken 12/6/2023 0800)  Care Plan - Patient's Chronic Conditions and Co-Morbidity Symptoms are Monitored and Maintained or Improved:   Monitor and assess patient's chronic conditions and comorbid symptoms for stability, deterioration, or improvement   Collaborate with multidisciplinary team to address chronic and comorbid conditions and prevent exacerbation or deterioration   Update acute care plan with appropriate goals if chronic or comorbid symptoms are exacerbated and prevent overall improvement and discharge     Problem: Safety - Adult  Goal: Free from fall injury  Outcome: Progressing

## 2023-12-06 NOTE — DIALYSIS
12/06/23  Dialysis treatment  Code Status- full  Diagnosis-uremia    Treatment time-3hrs  Fluid removed-500ml  BVP-52.1  Medications given-none  Hepatitis Status-12/5/23 NEG AG    Access-  Rt CVC, dressing c/d/I from 12/5/23. Ports cleaned, blood aspirated and lines flushed without any issues. Good blood flow noted. No s/s of infection present    Treatment-  1340 Dialysis started  1740 Dialysis completed and blood rinsed back.  New caps placed on each port, pt stable    Pre and Post Report given Gabe Wheeler RN

## 2023-12-07 ENCOUNTER — APPOINTMENT (OUTPATIENT)
Facility: HOSPITAL | Age: 68
End: 2023-12-07
Payer: MEDICARE

## 2023-12-07 LAB
ALBUMIN SERPL-MCNC: 3.3 G/DL (ref 3.4–5)
ALBUMIN/GLOB SERPL: 0.9 (ref 0.8–1.7)
ALP SERPL-CCNC: 45 U/L (ref 45–117)
ALT SERPL-CCNC: <6 U/L (ref 13–56)
ANION GAP SERPL CALC-SCNC: 7 MMOL/L (ref 3–18)
AST SERPL-CCNC: 10 U/L (ref 10–38)
BILIRUB SERPL-MCNC: 0.4 MG/DL (ref 0.2–1)
BUN SERPL-MCNC: 18 MG/DL (ref 7–18)
BUN/CREAT SERPL: 6 (ref 12–20)
CALCIUM SERPL-MCNC: 9.2 MG/DL (ref 8.5–10.1)
CHLORIDE SERPL-SCNC: 103 MMOL/L (ref 100–111)
CO2 SERPL-SCNC: 26 MMOL/L (ref 21–32)
CREAT SERPL-MCNC: 3.12 MG/DL (ref 0.6–1.3)
EKG ATRIAL RATE: 94 BPM
EKG DIAGNOSIS: NORMAL
EKG P-R INTERVAL: 232 MS
EKG Q-T INTERVAL: 382 MS
EKG QRS DURATION: 80 MS
EKG QTC CALCULATION (BAZETT): 477 MS
EKG R AXIS: 56 DEGREES
EKG T AXIS: 72 DEGREES
EKG VENTRICULAR RATE: 94 BPM
ERYTHROCYTE [DISTWIDTH] IN BLOOD BY AUTOMATED COUNT: 12.5 % (ref 11.6–14.5)
GLOBULIN SER CALC-MCNC: 3.7 G/DL (ref 2–4)
GLUCOSE BLD STRIP.AUTO-MCNC: 105 MG/DL (ref 70–110)
GLUCOSE BLD STRIP.AUTO-MCNC: 112 MG/DL (ref 70–110)
GLUCOSE BLD STRIP.AUTO-MCNC: 170 MG/DL (ref 70–110)
GLUCOSE BLD STRIP.AUTO-MCNC: 192 MG/DL (ref 70–110)
GLUCOSE SERPL-MCNC: 103 MG/DL (ref 74–99)
HCT VFR BLD AUTO: 30 % (ref 35–45)
HGB BLD-MCNC: 9.9 G/DL (ref 12–16)
MAGNESIUM SERPL-MCNC: 2.1 MG/DL (ref 1.6–2.6)
MCH RBC QN AUTO: 31 PG (ref 24–34)
MCHC RBC AUTO-ENTMCNC: 33 G/DL (ref 31–37)
MCV RBC AUTO: 94 FL (ref 78–100)
NRBC # BLD: 0 K/UL (ref 0–0.01)
NRBC BLD-RTO: 0 PER 100 WBC
PHOSPHATE SERPL-MCNC: 3.7 MG/DL (ref 2.5–4.9)
PLATELET # BLD AUTO: 269 K/UL (ref 135–420)
PMV BLD AUTO: 10.1 FL (ref 9.2–11.8)
POTASSIUM SERPL-SCNC: 3.5 MMOL/L (ref 3.5–5.5)
PROT SERPL-MCNC: 7 G/DL (ref 6.4–8.2)
RBC # BLD AUTO: 3.19 M/UL (ref 4.2–5.3)
SODIUM SERPL-SCNC: 136 MMOL/L (ref 136–145)
WBC # BLD AUTO: 11.1 K/UL (ref 4.6–13.2)

## 2023-12-07 PROCEDURE — 6370000000 HC RX 637 (ALT 250 FOR IP): Performed by: HOSPITALIST

## 2023-12-07 PROCEDURE — 6360000002 HC RX W HCPCS: Performed by: HOSPITALIST

## 2023-12-07 PROCEDURE — P9047 ALBUMIN (HUMAN), 25%, 50ML: HCPCS | Performed by: HOSPITALIST

## 2023-12-07 PROCEDURE — 86706 HEP B SURFACE ANTIBODY: CPT

## 2023-12-07 PROCEDURE — 1100000003 HC PRIVATE W/ TELEMETRY

## 2023-12-07 PROCEDURE — 97116 GAIT TRAINING THERAPY: CPT

## 2023-12-07 PROCEDURE — 2580000003 HC RX 258: Performed by: INTERNAL MEDICINE

## 2023-12-07 PROCEDURE — 6370000000 HC RX 637 (ALT 250 FOR IP): Performed by: INTERNAL MEDICINE

## 2023-12-07 PROCEDURE — 93005 ELECTROCARDIOGRAM TRACING: CPT | Performed by: HOSPITALIST

## 2023-12-07 PROCEDURE — 93010 ELECTROCARDIOGRAM REPORT: CPT | Performed by: INTERNAL MEDICINE

## 2023-12-07 PROCEDURE — 83735 ASSAY OF MAGNESIUM: CPT

## 2023-12-07 PROCEDURE — 97110 THERAPEUTIC EXERCISES: CPT

## 2023-12-07 PROCEDURE — 90935 HEMODIALYSIS ONE EVALUATION: CPT

## 2023-12-07 PROCEDURE — 85027 COMPLETE CBC AUTOMATED: CPT

## 2023-12-07 PROCEDURE — 71045 X-RAY EXAM CHEST 1 VIEW: CPT

## 2023-12-07 PROCEDURE — 80053 COMPREHEN METABOLIC PANEL: CPT

## 2023-12-07 PROCEDURE — 36415 COLL VENOUS BLD VENIPUNCTURE: CPT

## 2023-12-07 PROCEDURE — 82962 GLUCOSE BLOOD TEST: CPT

## 2023-12-07 PROCEDURE — 84100 ASSAY OF PHOSPHORUS: CPT

## 2023-12-07 RX ORDER — METOCLOPRAMIDE 5 MG/1
5 TABLET ORAL
Status: DISCONTINUED | OUTPATIENT
Start: 2023-12-07 | End: 2023-12-09 | Stop reason: HOSPADM

## 2023-12-07 RX ORDER — ALBUMIN (HUMAN) 12.5 G/50ML
25 SOLUTION INTRAVENOUS ONCE
Status: COMPLETED | OUTPATIENT
Start: 2023-12-07 | End: 2023-12-07

## 2023-12-07 RX ADMIN — METOCLOPRAMIDE 5 MG: 5 TABLET ORAL at 11:56

## 2023-12-07 RX ADMIN — METOCLOPRAMIDE 5 MG: 5 TABLET ORAL at 17:48

## 2023-12-07 RX ADMIN — ALBUMIN (HUMAN) 25 G: 0.25 INJECTION, SOLUTION INTRAVENOUS at 14:40

## 2023-12-07 RX ADMIN — ONDANSETRON 4 MG: 4 TABLET, ORALLY DISINTEGRATING ORAL at 12:53

## 2023-12-07 RX ADMIN — HEPARIN SODIUM 5000 UNITS: 5000 INJECTION INTRAVENOUS; SUBCUTANEOUS at 14:40

## 2023-12-07 RX ADMIN — HEPARIN SODIUM 5000 UNITS: 5000 INJECTION INTRAVENOUS; SUBCUTANEOUS at 06:30

## 2023-12-07 RX ADMIN — HEPARIN SODIUM 5000 UNITS: 5000 INJECTION INTRAVENOUS; SUBCUTANEOUS at 20:51

## 2023-12-07 RX ADMIN — FAMOTIDINE 20 MG: 20 TABLET, FILM COATED ORAL at 20:50

## 2023-12-07 RX ADMIN — SODIUM CHLORIDE, PRESERVATIVE FREE 10 ML: 5 INJECTION INTRAVENOUS at 08:32

## 2023-12-07 ASSESSMENT — PAIN SCALES - GENERAL
PAINLEVEL_OUTOF10: 0
PAINLEVEL_OUTOF10: 0

## 2023-12-07 NOTE — DIALYSIS
12/07/23  Dialysis treatment  Code Status-full  Diagnosis-uremia    Treatment time-3.5hrs  Fluid removed-500ml  BVP-67.3  Medications given-none  Hepatitis Status-12/5/23 NEG AG    Access-  Rt cvc present. Dressing c/d/I from 12/5/23. Ports cleaned, blood aspirated and lines flushed without any issues. Good blood flow noted. No s/s of infection present    Treatment-  0853 Dialysis started  1223 Dialysis completed and blood rinsed back.  New caps placed on each port, pt stable    Pre and Post Report given Dione Archibald RN

## 2023-12-07 NOTE — PROGRESS NOTES
Notified by physical therapy that patient BP drop to the 80's and has complaints of dizziness. Went to recheck BP and current BP is 93/48, no complaints of dizziness. MD Carlo Mendez paged. MD to place new orders for albumin.

## 2023-12-07 NOTE — PROGRESS NOTES
19/20    This AMPAC score should be considered in conjunction with interdisciplinary team recommendations to determine the most appropriate discharge setting. Patient's social support, diagnosis, medical stability, and prior level of function should also be taken into consideration. SUBJECTIVE:   Patient stated \"I'm feeling fine after dialysis. I can try walking\"    OBJECTIVE DATA SUMMARY:   Functional Mobility Training:  Bed Mobility:  Bed Mobility Training: Yes  Interventions: Verbal cues;Demonstration  Rolling: Independent  Supine to Sit: Independent  Sit to Supine: Independent  Scooting: Independent  Transfers:  Sit to Stand: Contact-guard assistance  Stand to Sit: Contact-guard assistance  Bed to Chair: Contact-guard assistance  Balance:  Sitting: Intact  Standing: Impaired (dizziness)  Standing - Static: Good  Standing - Dynamic: Good  Ambulation/Gait Training:  Overall Level of Assistance: Contact-guard assistance  Distance (ft): 4 Feet (x2)  Assistive Device: Gait belt;Walker, rolling  Speed/Neha: Slow  Stance: Time  Gait Abnormalities: Decreased step clearance;Trunk sway increased  Therapeutic Exercises:   supine ankle pumps, quad set, glute set, shoulder IR/ER, shoulder flexion, elbow flexion  Pain:  Pain level pre-treatment: 0/10  Pain level post-treatment: 0/10   Pain Intervention(s): Rest, Ice, Repositioning  Response to intervention: Nurse notified    Activity Tolerance:   Activity Tolerance: Treatment limited secondary to medical complications; Patient tolerated treatment well (orthostatic hypotension)    After treatment:   [] Patient left in no apparent distress sitting up in chair  [x] Patient left in no apparent distress in bed  [x] Call bell left within reach  [x] Nursing notified  [] Caregiver present  [] Bed alarm activated  [] SCDs applied      COMMUNICATION/EDUCATION:   Patient Education  Education Given To: Patient  Education Provided: Role of Therapy;Plan of Care;Home Exercise

## 2023-12-07 NOTE — PROGRESS NOTES
Physical Therapy Treatment Attempt    Chart reviewed. Attempted Physical Therapy Treatment, however, patient unable to be seen due to:  []  Nausea/vomiting  []  Eating  []  Pain  []  Patient too lethargic  []  Off Unit for testing/procedure  [x]  Dialysis treatment in progress   []  Patient refused, despite encouragement and education. []  Other:     Will follow up as patient's schedule allows.    Leroy Hollingsworth, LETY

## 2023-12-07 NOTE — PROGRESS NOTES
Called into patient's room due to patient complaining of chest pain/discomfort. Patient unsure if discomfort is gas related. Patient states \" it just hurts\". Vitals stable /68 HR 96 RR 20 temp 97.9 O2 100% on room air. MD paged Chest X-ray and EKG order placed.  Patient placed on 1.5L NC for comfort

## 2023-12-07 NOTE — PROGRESS NOTES
Patient reports discomfort in lower left ribs non radiating with point tenderness with palpation at diaphram level. Dr. Marissa Fernandez notified and new orders received.

## 2023-12-07 NOTE — PLAN OF CARE
Problem: Chronic Conditions and Co-morbidities  Goal: Patient's chronic conditions and co-morbidity symptoms are monitored and maintained or improved  Outcome: Progressing  Flowsheets (Taken 12/6/2023 2053 by Nita Medina RN)  Care Plan - Patient's Chronic Conditions and Co-Morbidity Symptoms are Monitored and Maintained or Improved:   Monitor and assess patient's chronic conditions and comorbid symptoms for stability, deterioration, or improvement   Collaborate with multidisciplinary team to address chronic and comorbid conditions and prevent exacerbation or deterioration   Update acute care plan with appropriate goals if chronic or comorbid symptoms are exacerbated and prevent overall improvement and discharge   Receiving 3rd dialysis treatment today

## 2023-12-08 LAB
GLUCOSE BLD STRIP.AUTO-MCNC: 114 MG/DL (ref 70–110)
GLUCOSE BLD STRIP.AUTO-MCNC: 181 MG/DL (ref 70–110)
GLUCOSE BLD STRIP.AUTO-MCNC: 202 MG/DL (ref 70–110)
GLUCOSE BLD STRIP.AUTO-MCNC: 226 MG/DL (ref 70–110)

## 2023-12-08 PROCEDURE — 2580000003 HC RX 258: Performed by: INTERNAL MEDICINE

## 2023-12-08 PROCEDURE — 6360000002 HC RX W HCPCS: Performed by: HOSPITALIST

## 2023-12-08 PROCEDURE — 36415 COLL VENOUS BLD VENIPUNCTURE: CPT

## 2023-12-08 PROCEDURE — 6370000000 HC RX 637 (ALT 250 FOR IP): Performed by: HOSPITALIST

## 2023-12-08 PROCEDURE — 82962 GLUCOSE BLOOD TEST: CPT

## 2023-12-08 PROCEDURE — 87340 HEPATITIS B SURFACE AG IA: CPT

## 2023-12-08 PROCEDURE — 6370000000 HC RX 637 (ALT 250 FOR IP): Performed by: INTERNAL MEDICINE

## 2023-12-08 PROCEDURE — 1100000003 HC PRIVATE W/ TELEMETRY

## 2023-12-08 RX ADMIN — HEPARIN SODIUM 5000 UNITS: 5000 INJECTION INTRAVENOUS; SUBCUTANEOUS at 13:30

## 2023-12-08 RX ADMIN — METOCLOPRAMIDE 5 MG: 5 TABLET ORAL at 15:42

## 2023-12-08 RX ADMIN — ONDANSETRON 4 MG: 4 TABLET, ORALLY DISINTEGRATING ORAL at 07:48

## 2023-12-08 RX ADMIN — SODIUM CHLORIDE, PRESERVATIVE FREE 10 ML: 5 INJECTION INTRAVENOUS at 08:25

## 2023-12-08 RX ADMIN — METOCLOPRAMIDE 5 MG: 5 TABLET ORAL at 11:13

## 2023-12-08 RX ADMIN — SODIUM CHLORIDE, PRESERVATIVE FREE 10 ML: 5 INJECTION INTRAVENOUS at 21:27

## 2023-12-08 RX ADMIN — FAMOTIDINE 20 MG: 20 TABLET, FILM COATED ORAL at 21:24

## 2023-12-08 RX ADMIN — HEPARIN SODIUM 5000 UNITS: 5000 INJECTION INTRAVENOUS; SUBCUTANEOUS at 21:25

## 2023-12-08 RX ADMIN — HEPARIN SODIUM 5000 UNITS: 5000 INJECTION INTRAVENOUS; SUBCUTANEOUS at 05:51

## 2023-12-08 RX ADMIN — METOCLOPRAMIDE 5 MG: 5 TABLET ORAL at 05:51

## 2023-12-08 RX ADMIN — ONDANSETRON 4 MG: 4 TABLET, ORALLY DISINTEGRATING ORAL at 17:13

## 2023-12-08 ASSESSMENT — PAIN SCALES - GENERAL: PAINLEVEL_OUTOF10: 0

## 2023-12-08 NOTE — ACP (ADVANCE CARE PLANNING)
Advance Care Planning     Advance Care Planning Inpatient Note  [facility] Department    Today's Date: 12/8/2023  Unit: THE [address] CARDIAC/MEDICAL    Received request from rounding. Upon review of chart and communication with care team, patient's decision making abilities are not in question. . Patient was/were present in the room during visit. Goals of ACP Conversation:  Discuss advance care planning documents    Health Care Decision Makers:     No healthcare decision makers have been documented. Click here to complete [address] including selection of the Healthcare Decision Maker Relationship (ie \"Primary\")  Summary:  No Decision Maker named by patient at this time    Advance Care Planning Documents (Patient Wishes):  None     Assessment:   and patient discussed Advance care planning. She stated she is not  and lives with her daughter and has a son in the area. She said that her children would be her spokespersons, however did not want to complete documents at this time.     Interventions:  Provided education on documents for clarity and greater understanding  Discussed and provided education on state decision maker hierarchy    Care Preferences Communicated:   No    Electronically signed by Talat Banks[address] [place] [address] on 12/8/2023 at 1:57 PM

## 2023-12-08 NOTE — PROGRESS NOTES
Progress Notes:  Assumed care of patient in bed with family @ bedside. VSS, uneventful night,call bell within reach.

## 2023-12-08 NOTE — PROGRESS NOTES
Awaiting outpatient dialysis chair time. MD stated that patient to DC home after dialysis Sat. Patient agreeable to plan, simply awaiting time of dialysis.

## 2023-12-08 NOTE — PROGRESS NOTES
T Th Sat - 1130am chair time at [address] CMS aware and will add location to AMS for patient upon DC. Patient stated that she has transport to and from center. RN spoke with dialysis center and labs faxed over to [facility] for review. Family to transport patient home tomorrow after dialysis. Patient aware that first time dialysis needed in center is 12/12.

## 2023-12-08 NOTE — PROGRESS NOTES
encephalopathy , uremic pericarditis, vascular calcification, acidosis and hyperkalemia leading to cardiac arrhythmia, cardiac arrest and death . Discussed dialysis , procedure details , risk , benefit , alternative medical management and it's limitation. She voiced understanding and gave consent to place [facility] and start dialysis . ESRD  Hypertension  Hyperphosphatemia   Type 2 diabetes   Anemia  of CKD     Plan:    Patient examined and available labs reviewed . No indication of dialysis.     Next dialysis on Saturday     consult for outpatient dialysis chair   Minimize volume infusion   Atenolol for hypertension   Continue bumex to 1 mg daily   LAURA as indicated during HD for anemia   Phos is improving, PTH is at target for ESRD   Dose all meds for ESRD  Retacrit for anemia     She has outpatient chair at [facility]  starting next Tuesday   Can go home after dialysis on Saturday         Ronda Magana MD, MD  [facility].- Nephrology

## 2023-12-09 VITALS
HEIGHT: 64 IN | BODY MASS INDEX: 21.23 KG/M2 | HEART RATE: 84 BPM | WEIGHT: 124.34 LBS | OXYGEN SATURATION: 99 % | DIASTOLIC BLOOD PRESSURE: 81 MMHG | RESPIRATION RATE: 18 BRPM | SYSTOLIC BLOOD PRESSURE: 155 MMHG | TEMPERATURE: 98 F

## 2023-12-09 LAB
ANION GAP SERPL CALC-SCNC: 8 MMOL/L (ref 3–18)
BUN SERPL-MCNC: 30 MG/DL (ref 7–18)
BUN/CREAT SERPL: 6 (ref 12–20)
CALCIUM SERPL-MCNC: 9.4 MG/DL (ref 8.5–10.1)
CHLORIDE SERPL-SCNC: 103 MMOL/L (ref 100–111)
CO2 SERPL-SCNC: 26 MMOL/L (ref 21–32)
CREAT SERPL-MCNC: 4.74 MG/DL (ref 0.6–1.3)
GLUCOSE BLD STRIP.AUTO-MCNC: 135 MG/DL (ref 70–110)
GLUCOSE BLD STRIP.AUTO-MCNC: 150 MG/DL (ref 70–110)
GLUCOSE SERPL-MCNC: 156 MG/DL (ref 74–99)
HBV SURFACE AG SER QL: <0.1 INDEX
HBV SURFACE AG SER QL: NEGATIVE
HCT VFR BLD AUTO: 30.3 % (ref 35–45)
HGB BLD-MCNC: 9.8 G/DL (ref 12–16)
PHOSPHATE SERPL-MCNC: 5 MG/DL (ref 2.5–4.9)
POTASSIUM SERPL-SCNC: 3.2 MMOL/L (ref 3.5–5.5)
SODIUM SERPL-SCNC: 137 MMOL/L (ref 136–145)

## 2023-12-09 PROCEDURE — 2580000003 HC RX 258: Performed by: INTERNAL MEDICINE

## 2023-12-09 PROCEDURE — 85018 HEMOGLOBIN: CPT

## 2023-12-09 PROCEDURE — 85014 HEMATOCRIT: CPT

## 2023-12-09 PROCEDURE — 84100 ASSAY OF PHOSPHORUS: CPT

## 2023-12-09 PROCEDURE — 6360000002 HC RX W HCPCS: Performed by: HOSPITALIST

## 2023-12-09 PROCEDURE — 82962 GLUCOSE BLOOD TEST: CPT

## 2023-12-09 PROCEDURE — 80048 BASIC METABOLIC PNL TOTAL CA: CPT

## 2023-12-09 PROCEDURE — 86706 HEP B SURFACE ANTIBODY: CPT

## 2023-12-09 PROCEDURE — 90935 HEMODIALYSIS ONE EVALUATION: CPT

## 2023-12-09 PROCEDURE — 6370000000 HC RX 637 (ALT 250 FOR IP): Performed by: HOSPITALIST

## 2023-12-09 PROCEDURE — 6360000002 HC RX W HCPCS: Performed by: SURGERY

## 2023-12-09 PROCEDURE — 36415 COLL VENOUS BLD VENIPUNCTURE: CPT

## 2023-12-09 RX ORDER — METOCLOPRAMIDE 5 MG/1
5 TABLET ORAL
Qty: 120 TABLET | Refills: 0 | Status: SHIPPED | OUTPATIENT
Start: 2023-12-09

## 2023-12-09 RX ADMIN — HEPARIN SODIUM 4000 UNITS: 1000 INJECTION INTRAVENOUS; SUBCUTANEOUS at 14:24

## 2023-12-09 RX ADMIN — SODIUM CHLORIDE, PRESERVATIVE FREE 10 ML: 5 INJECTION INTRAVENOUS at 08:27

## 2023-12-09 RX ADMIN — METOCLOPRAMIDE 5 MG: 5 TABLET ORAL at 06:33

## 2023-12-09 RX ADMIN — HEPARIN SODIUM 5000 UNITS: 5000 INJECTION INTRAVENOUS; SUBCUTANEOUS at 06:19

## 2023-12-09 RX ADMIN — METOCLOPRAMIDE 5 MG: 5 TABLET ORAL at 12:07

## 2023-12-09 ASSESSMENT — PAIN SCALES - GENERAL
PAINLEVEL_OUTOF10: 0
PAINLEVEL_OUTOF10: 0

## 2023-12-09 NOTE — DISCHARGE INSTRUCTIONS
DISCHARGE SUMMARY from Nurse    PATIENT INSTRUCTIONS:    After general anesthesia or intravenous sedation, for 24 hours or while taking prescription Narcotics:  Limit your activities  Do not drive and operate hazardous machinery  Do not make important personal or business decisions  Do  not drink alcoholic beverages  If you have not urinated within 8 hours after discharge, please contact your surgeon on call. Report the following to your surgeon:  Excessive pain, swelling, redness or odor of or around the surgical area  Temperature over 100.5  Nausea and vomiting lasting longer than 4 hours or if unable to take medications  Any signs of decreased circulation or nerve impairment to extremity: change in color, persistent  numbness, tingling, coldness or increase pain  Any questions    What to do at Home:  Recommended activity: activity as tolerated,     If you experience any of the following symptoms , please follow up with . *  Please give a list of your current medications to your Primary Care Provider. *  Please update this list whenever your medications are discontinued, doses are      changed, or new medications (including over-the-counter products) are added. *  Please carry medication information at all times in case of emergency situations. These are general instructions for a healthy lifestyle:    No smoking/ No tobacco products/ Avoid exposure to second hand smoke  Surgeon General's Warning:  Quitting smoking now greatly reduces serious risk to your health.     Obesity, smoking, and sedentary lifestyle greatly increases your risk for illness    A healthy diet, regular physical exercise & weight monitoring are important for maintaining a healthy lifestyle    You may be retaining fluid if you have a history of heart failure or if you experience any of the following symptoms:  Weight gain of 3 pounds or more overnight or 5 pounds in a week, increased swelling in our hands or feet or shortness of

## 2023-12-09 NOTE — PLAN OF CARE
Intervention: Monitor/Manage Fluid Electrolyte/Acid Base Balance    PROBLEM: HEMODIALYSIS ADULT    INTERVENTION: Hemodynamic stabilization  Maintain BP WNL for this patient while on hemodialysis    INTERVENTION: Fluid Management  Will attempt 500 ml total fluid removal as tolerated    INTERVENTION: Metabolic / Electrolyte Imbalance Management  3 K+ potassium bath today with dialysis    GOAL: Signs and symptoms of listed potential problems will be absent or manageable  (reference Hemodialysis ADULT CPG)    OUTCOME: Progressing  HD planned for 3.5 hours today      Problem: Chronic Conditions and Co-morbidities  Goal: Patient's chronic conditions and co-morbidity symptoms are monitored and maintained or improved  Outcome: Progressing  Flowsheets (Taken 12/9/2023 0800 by Elias Madsen RN)  Care Plan - Patient's Chronic Conditions and Co-Morbidity Symptoms are Monitored and Maintained or Improved:   Monitor and assess patient's chronic conditions and comorbid symptoms for stability, deterioration, or improvement   Collaborate with multidisciplinary team to address chronic and comorbid conditions and prevent exacerbation or deterioration

## 2023-12-09 NOTE — PROGRESS NOTES
2317  Bedside and verbal shift change report given to Alvaro Thomason RN (on coming nurse) by Mahendra Dodge RN (off going nurse). Report included the following information SBAR, Kardex, Intake/Output and MAR.    0721  Bedside and verbal shift change report given to Joyce Ness RN (on coming nurse) by Alvaro Thomason RN (off going nurse). Report included the following information SBAR, Kardex, Intake/Output and MAR.

## 2023-12-09 NOTE — PROGRESS NOTES
then every 30 minutes from Secondary)     TREATMENT INITIATION - with Dialysis Precautions:   [x] All Connections Secured              [x] Saline Line Double Clamped   [x] Venous Parameters Set               [x] Arterial Parameters Set    [x] Prime Given 250ml NSS              [x]Air Foam Detector Engaged      Treatment Initiation Note:  4261   Tx Initiation Note: Received patient in bed semi fowlers position. Right IJ Trialysis catheter remains intact. No s/s of infection noted. Dressing to same in place. Treatment initiated as per MD order without incident. Plan to remove 500 mL for 3.5 hours while monitoring patient's well-being and vital signs. During Treatment Notes:  1102  Face & Vascular access visible with art and nicolas line connections intact. Pt tolerating dialysis. 1115  Face & Vascular access visible with art and nicolas line connections intact. Pt tolerating dialysis. 1130  Face & Vascular access visible with art and nicolas line connections intact. Pt tolerating dialysis. 1145  Face & Vascular access visible with art and nicolas line connections intact. Pt tolerating dialysis. 1200  Face & Vascular access visible with art and nicolas line connections intact. Pt tolerating dialysis. 1215  Face & Vascular access visible with art and nicolas line connections intact. Pt tolerating dialysis. 1230  Face & Vascular access visible with art and nicolas line connections intact. Pt tolerating dialysis. 1245  Face & Vascular access visible with art and nicolas line connections intact. Pt tolerating dialysis. 1300  Face & Vascular access visible with art and nicolas line connections intact. Pt tolerating dialysis. 1315  Face & Vascular access visible with art and nicolas line connections intact. Pt tolerating dialysis. 1330  Face & Vascular access visible with art and nicolas line connections intact. Pt tolerating dialysis. 1345  Face & Vascular access visible with art and nicolas line connections intact. Pt tolerating dialysis.   1400  Face & pressure, -venous pressure, UF-ultrafiltrate, TMP-transmembrane pressure, Mariano-Venous, Art-Arterial, RO-Reverse Osmosis

## 2023-12-10 ENCOUNTER — HOME HEALTH ADMISSION (OUTPATIENT)
Age: 68
End: 2023-12-10

## 2023-12-11 LAB
HBV SURFACE AB SER QL IA: NEGATIVE
HBV SURFACE AB SER QL IA: NEGATIVE
HBV SURFACE AB SERPL IA-ACNC: <3.1 MIU/ML
HBV SURFACE AB SERPL IA-ACNC: <3.1 MIU/ML
HEP BS AB COMMENT: ABNORMAL
HEP BS AB COMMENT: ABNORMAL

## 2023-12-12 ENCOUNTER — HOME CARE VISIT (OUTPATIENT)
Age: 68
End: 2023-12-12

## 2023-12-13 ENCOUNTER — HOME CARE VISIT (OUTPATIENT)
Age: 68
End: 2023-12-13

## 2023-12-15 ENCOUNTER — HOME CARE VISIT (OUTPATIENT)
Age: 68
End: 2023-12-15

## 2024-03-01 NOTE — PRE-PROCEDURE INSTRUCTIONS
Spoke to patient to schedule appointment.  Instructed to arrive at 1030 for 1100 appointment.  Pt denies anticoagulation use.   Medications and allergies reviewed.  Opportunity for questions provided.

## 2024-03-08 ENCOUNTER — HOSPITAL ENCOUNTER (OUTPATIENT)
Facility: HOSPITAL | Age: 69
End: 2024-03-08
Attending: STUDENT IN AN ORGANIZED HEALTH CARE EDUCATION/TRAINING PROGRAM
Payer: MEDICARE

## 2024-03-08 DIAGNOSIS — Z45.2 ENCOUNTER FOR REMOVAL OF VASCULAR CATHETER: ICD-10-CM

## 2024-03-08 PROCEDURE — 2500000003 HC RX 250 WO HCPCS: Performed by: STUDENT IN AN ORGANIZED HEALTH CARE EDUCATION/TRAINING PROGRAM

## 2024-03-08 PROCEDURE — 77001 FLUOROGUIDE FOR VEIN DEVICE: CPT

## 2024-03-08 RX ORDER — LIDOCAINE HYDROCHLORIDE 10 MG/ML
10 INJECTION, SOLUTION EPIDURAL; INFILTRATION; INTRACAUDAL; PERINEURAL
Status: COMPLETED | OUTPATIENT
Start: 2024-03-08 | End: 2024-03-08

## 2024-03-08 RX ADMIN — LIDOCAINE HYDROCHLORIDE 6 ML: 10 INJECTION, SOLUTION EPIDURAL; INFILTRATION; INTRACAUDAL; PERINEURAL at 11:20

## 2024-05-24 ENCOUNTER — APPOINTMENT (OUTPATIENT)
Facility: HOSPITAL | Age: 69
DRG: 480 | End: 2024-05-24
Payer: MEDICARE

## 2024-05-24 ENCOUNTER — HOSPITAL ENCOUNTER (INPATIENT)
Facility: HOSPITAL | Age: 69
LOS: 5 days | Discharge: HOME HEALTH | DRG: 480 | End: 2024-05-29
Attending: EMERGENCY MEDICINE | Admitting: HOSPITALIST
Payer: MEDICARE

## 2024-05-24 DIAGNOSIS — M79.604 PAIN IN BOTH LOWER EXTREMITIES: ICD-10-CM

## 2024-05-24 DIAGNOSIS — Z99.2 CKD (CHRONIC KIDNEY DISEASE) STAGE V REQUIRING CHRONIC DIALYSIS (HCC): Primary | ICD-10-CM

## 2024-05-24 DIAGNOSIS — S72.001A CLOSED DISPLACED FRACTURE OF RIGHT FEMORAL NECK (HCC): ICD-10-CM

## 2024-05-24 DIAGNOSIS — S72.001A CLOSED RIGHT HIP FRACTURE, INITIAL ENCOUNTER (HCC): ICD-10-CM

## 2024-05-24 DIAGNOSIS — M79.605 PAIN IN BOTH LOWER EXTREMITIES: ICD-10-CM

## 2024-05-24 DIAGNOSIS — N18.6 CKD (CHRONIC KIDNEY DISEASE) STAGE V REQUIRING CHRONIC DIALYSIS (HCC): Primary | ICD-10-CM
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
## 2024-05-24 LAB
ALBUMIN SERPL-MCNC: 2.4 G/DL (ref 3.4–5)
ALBUMIN/GLOB SERPL: 0.7 (ref 0.8–1.7)
ALP SERPL-CCNC: 50 U/L (ref 45–117)
ALT SERPL-CCNC: 9 U/L (ref 13–56)
ANION GAP SERPL CALC-SCNC: 4 MMOL/L (ref 3–18)
AST SERPL-CCNC: 11 U/L (ref 10–38)
BASOPHILS # BLD: 0 K/UL (ref 0–0.1)
BASOPHILS NFR BLD: 0 % (ref 0–2)
BILIRUB SERPL-MCNC: 0.3 MG/DL (ref 0.2–1)
BUN SERPL-MCNC: 60 MG/DL (ref 7–18)
BUN/CREAT SERPL: 9 (ref 12–20)
CALCIUM SERPL-MCNC: 8.5 MG/DL (ref 8.5–10.1)
CHLORIDE SERPL-SCNC: 106 MMOL/L (ref 100–111)
CK SERPL-CCNC: 103 U/L (ref 26–192)
CO2 SERPL-SCNC: 28 MMOL/L (ref 21–32)
CREAT SERPL-MCNC: 6.88 MG/DL (ref 0.6–1.3)
DIFFERENTIAL METHOD BLD: ABNORMAL
EOSINOPHIL # BLD: 0.2 K/UL (ref 0–0.4)
EOSINOPHIL NFR BLD: 2 % (ref 0–5)
ERYTHROCYTE [DISTWIDTH] IN BLOOD BY AUTOMATED COUNT: 13.2 % (ref 11.6–14.5)
EST. AVERAGE GLUCOSE BLD GHB EST-MCNC: 180 MG/DL
GLOBULIN SER CALC-MCNC: 3.5 G/DL (ref 2–4)
GLUCOSE BLD STRIP.AUTO-MCNC: 196 MG/DL (ref 70–110)
GLUCOSE BLD STRIP.AUTO-MCNC: 243 MG/DL (ref 70–110)
GLUCOSE SERPL-MCNC: 194 MG/DL (ref 74–99)
HBA1C MFR BLD: 7.9 % (ref 4.2–5.6)
HCT VFR BLD AUTO: 28.8 % (ref 35–45)
HGB BLD-MCNC: 9.3 G/DL (ref 12–16)
IMM GRANULOCYTES # BLD AUTO: 0.1 K/UL (ref 0–0.04)
IMM GRANULOCYTES NFR BLD AUTO: 1 % (ref 0–0.5)
INR PPP: 1 (ref 0.9–1.1)
LYMPHOCYTES # BLD: 2.4 K/UL (ref 0.9–3.6)
LYMPHOCYTES NFR BLD: 21 % (ref 21–52)
MCH RBC QN AUTO: 31.5 PG (ref 24–34)
MCHC RBC AUTO-ENTMCNC: 32.3 G/DL (ref 31–37)
MCV RBC AUTO: 97.6 FL (ref 78–100)
MONOCYTES # BLD: 1.3 K/UL (ref 0.05–1.2)
MONOCYTES NFR BLD: 11 % (ref 3–10)
NEUTS SEG # BLD: 7.7 K/UL (ref 1.8–8)
NEUTS SEG NFR BLD: 65 % (ref 40–73)
NRBC # BLD: 0 K/UL (ref 0–0.01)
NRBC BLD-RTO: 0 PER 100 WBC
PLATELET # BLD AUTO: 259 K/UL (ref 135–420)
PMV BLD AUTO: 10.7 FL (ref 9.2–11.8)
POTASSIUM SERPL-SCNC: 4.7 MMOL/L (ref 3.5–5.5)
PROT SERPL-MCNC: 5.9 G/DL (ref 6.4–8.2)
PROTHROMBIN TIME: 12.9 SEC (ref 11.9–14.7)
RBC # BLD AUTO: 2.95 M/UL (ref 4.2–5.3)
SODIUM SERPL-SCNC: 138 MMOL/L (ref 136–145)
TROPONIN I SERPL HS-MCNC: 14 NG/L (ref 0–54)
WBC # BLD AUTO: 11.7 K/UL (ref 4.6–13.2)

## 2024-05-24 PROCEDURE — 6370000000 HC RX 637 (ALT 250 FOR IP): Performed by: HOSPITALIST

## 2024-05-24 PROCEDURE — 71045 X-RAY EXAM CHEST 1 VIEW: CPT

## 2024-05-24 PROCEDURE — 82550 ASSAY OF CK (CPK): CPT

## 2024-05-24 PROCEDURE — 80053 COMPREHEN METABOLIC PANEL: CPT

## 2024-05-24 PROCEDURE — 6360000002 HC RX W HCPCS: Performed by: HOSPITALIST

## 2024-05-24 PROCEDURE — 96375 TX/PRO/DX INJ NEW DRUG ADDON: CPT

## 2024-05-24 PROCEDURE — 83036 HEMOGLOBIN GLYCOSYLATED A1C: CPT

## 2024-05-24 PROCEDURE — 6360000002 HC RX W HCPCS: Performed by: EMERGENCY MEDICINE

## 2024-05-24 PROCEDURE — 6360000002 HC RX W HCPCS: Performed by: STUDENT IN AN ORGANIZED HEALTH CARE EDUCATION/TRAINING PROGRAM

## 2024-05-24 PROCEDURE — 99285 EMERGENCY DEPT VISIT HI MDM: CPT

## 2024-05-24 PROCEDURE — 73700 CT LOWER EXTREMITY W/O DYE: CPT

## 2024-05-24 PROCEDURE — 73502 X-RAY EXAM HIP UNI 2-3 VIEWS: CPT

## 2024-05-24 PROCEDURE — 2580000003 HC RX 258: Performed by: HOSPITALIST

## 2024-05-24 PROCEDURE — A4722 DIALYS SOL FLD VOL > 1999CC: HCPCS | Performed by: STUDENT IN AN ORGANIZED HEALTH CARE EDUCATION/TRAINING PROGRAM

## 2024-05-24 PROCEDURE — 85610 PROTHROMBIN TIME: CPT

## 2024-05-24 PROCEDURE — 96374 THER/PROPH/DIAG INJ IV PUSH: CPT

## 2024-05-24 PROCEDURE — 85025 COMPLETE CBC W/AUTO DIFF WBC: CPT

## 2024-05-24 PROCEDURE — 1100000003 HC PRIVATE W/ TELEMETRY

## 2024-05-24 PROCEDURE — 84484 ASSAY OF TROPONIN QUANT: CPT

## 2024-05-24 PROCEDURE — 82962 GLUCOSE BLOOD TEST: CPT

## 2024-05-24 RX ORDER — ACETAMINOPHEN 650 MG/1
650 SUPPOSITORY RECTAL EVERY 6 HOURS PRN
Status: DISCONTINUED | OUTPATIENT
Start: 2024-05-24 | End: 2024-05-29 | Stop reason: HOSPADM

## 2024-05-24 RX ORDER — HEPARIN SODIUM 5000 [USP'U]/ML
5000 INJECTION, SOLUTION INTRAVENOUS; SUBCUTANEOUS EVERY 8 HOURS SCHEDULED
Status: DISCONTINUED | OUTPATIENT
Start: 2024-05-24 | End: 2024-05-29 | Stop reason: HOSPADM

## 2024-05-24 RX ORDER — GLUCAGON 1 MG/ML
1 KIT INJECTION PRN
Status: DISCONTINUED | OUTPATIENT
Start: 2024-05-24 | End: 2024-05-29 | Stop reason: HOSPADM

## 2024-05-24 RX ORDER — HYDROMORPHONE HYDROCHLORIDE 1 MG/ML
1 INJECTION, SOLUTION INTRAMUSCULAR; INTRAVENOUS; SUBCUTANEOUS EVERY 4 HOURS PRN
Status: DISCONTINUED | OUTPATIENT
Start: 2024-05-24 | End: 2024-05-29 | Stop reason: HOSPADM

## 2024-05-24 RX ORDER — HYDROMORPHONE HYDROCHLORIDE 1 MG/ML
0.5 INJECTION, SOLUTION INTRAMUSCULAR; INTRAVENOUS; SUBCUTANEOUS
Status: COMPLETED | OUTPATIENT
Start: 2024-05-24 | End: 2024-05-24

## 2024-05-24 RX ORDER — SODIUM CHLORIDE 0.9 % (FLUSH) 0.9 %
5-40 SYRINGE (ML) INJECTION PRN
Status: DISCONTINUED | OUTPATIENT
Start: 2024-05-24 | End: 2024-05-29 | Stop reason: HOSPADM

## 2024-05-24 RX ORDER — OXYCODONE HYDROCHLORIDE AND ACETAMINOPHEN 5; 325 MG/1; MG/1
1 TABLET ORAL EVERY 4 HOURS PRN
Status: DISCONTINUED | OUTPATIENT
Start: 2024-05-24 | End: 2024-05-25

## 2024-05-24 RX ORDER — DEXTROSE MONOHYDRATE 100 MG/ML
INJECTION, SOLUTION INTRAVENOUS CONTINUOUS PRN
Status: DISCONTINUED | OUTPATIENT
Start: 2024-05-24 | End: 2024-05-29 | Stop reason: HOSPADM

## 2024-05-24 RX ORDER — NALOXONE HYDROCHLORIDE 0.4 MG/ML
0.4 INJECTION, SOLUTION INTRAMUSCULAR; INTRAVENOUS; SUBCUTANEOUS PRN
Status: DISCONTINUED | OUTPATIENT
Start: 2024-05-24 | End: 2024-05-29 | Stop reason: HOSPADM

## 2024-05-24 RX ORDER — ONDANSETRON 4 MG/1
4 TABLET, ORALLY DISINTEGRATING ORAL EVERY 8 HOURS PRN
Status: DISCONTINUED | OUTPATIENT
Start: 2024-05-24 | End: 2024-05-29 | Stop reason: HOSPADM

## 2024-05-24 RX ORDER — ATENOLOL 25 MG/1
25 TABLET ORAL DAILY
Status: DISCONTINUED | OUTPATIENT
Start: 2024-05-25 | End: 2024-05-29 | Stop reason: HOSPADM

## 2024-05-24 RX ORDER — INSULIN LISPRO 100 [IU]/ML
0-4 INJECTION, SOLUTION INTRAVENOUS; SUBCUTANEOUS NIGHTLY
Status: DISCONTINUED | OUTPATIENT
Start: 2024-05-24 | End: 2024-05-29 | Stop reason: HOSPADM

## 2024-05-24 RX ORDER — SODIUM CHLORIDE 0.9 % (FLUSH) 0.9 %
5-40 SYRINGE (ML) INJECTION EVERY 12 HOURS SCHEDULED
Status: DISCONTINUED | OUTPATIENT
Start: 2024-05-24 | End: 2024-05-29 | Stop reason: HOSPADM

## 2024-05-24 RX ORDER — HYDRALAZINE HYDROCHLORIDE 20 MG/ML
10 INJECTION INTRAMUSCULAR; INTRAVENOUS EVERY 6 HOURS PRN
Status: DISCONTINUED | OUTPATIENT
Start: 2024-05-24 | End: 2024-05-29 | Stop reason: HOSPADM

## 2024-05-24 RX ORDER — ACETAMINOPHEN 325 MG/1
650 TABLET ORAL EVERY 6 HOURS PRN
Status: DISCONTINUED | OUTPATIENT
Start: 2024-05-24 | End: 2024-05-29 | Stop reason: HOSPADM

## 2024-05-24 RX ORDER — INSULIN LISPRO 100 [IU]/ML
0-8 INJECTION, SOLUTION INTRAVENOUS; SUBCUTANEOUS
Status: DISCONTINUED | OUTPATIENT
Start: 2024-05-24 | End: 2024-05-29 | Stop reason: HOSPADM

## 2024-05-24 RX ORDER — POLYETHYLENE GLYCOL 3350 17 G/17G
17 POWDER, FOR SOLUTION ORAL DAILY PRN
Status: DISCONTINUED | OUTPATIENT
Start: 2024-05-24 | End: 2024-05-29 | Stop reason: HOSPADM

## 2024-05-24 RX ORDER — ONDANSETRON 2 MG/ML
4 INJECTION INTRAMUSCULAR; INTRAVENOUS EVERY 6 HOURS PRN
Status: DISCONTINUED | OUTPATIENT
Start: 2024-05-24 | End: 2024-05-29 | Stop reason: HOSPADM

## 2024-05-24 RX ORDER — SODIUM CHLORIDE, SODIUM LACTATE, CALCIUM CHLORIDE, MAGNESIUM CHLORIDE AND DEXTROSE 1.5; 538; 448; 18.3; 5.08 G/100ML; MG/100ML; MG/100ML; MG/100ML; MG/100ML
2000 INJECTION, SOLUTION INTRAPERITONEAL
Status: DISCONTINUED | OUTPATIENT
Start: 2024-05-24 | End: 2024-05-28

## 2024-05-24 RX ORDER — FENTANYL CITRATE 50 UG/ML
50 INJECTION, SOLUTION INTRAMUSCULAR; INTRAVENOUS
Status: COMPLETED | OUTPATIENT
Start: 2024-05-24 | End: 2024-05-24

## 2024-05-24 RX ORDER — SODIUM CHLORIDE 9 MG/ML
INJECTION, SOLUTION INTRAVENOUS PRN
Status: DISCONTINUED | OUTPATIENT
Start: 2024-05-24 | End: 2024-05-29 | Stop reason: HOSPADM

## 2024-05-24 RX ADMIN — SODIUM CHLORIDE, SODIUM LACTATE, CALCIUM CHLORIDE, MAGNESIUM CHLORIDE AND DEXTROSE 2000 ML: 1.5; 538; 448; 18.3; 5.08 INJECTION, SOLUTION INTRAPERITONEAL at 20:00

## 2024-05-24 RX ADMIN — SODIUM CHLORIDE, PRESERVATIVE FREE 10 ML: 5 INJECTION INTRAVENOUS at 21:04

## 2024-05-24 RX ADMIN — FENTANYL CITRATE 50 MCG: 50 INJECTION INTRAMUSCULAR; INTRAVENOUS at 12:36

## 2024-05-24 RX ADMIN — HEPARIN SODIUM 5000 UNITS: 5000 INJECTION INTRAVENOUS; SUBCUTANEOUS at 22:30

## 2024-05-24 RX ADMIN — OXYCODONE HYDROCHLORIDE AND ACETAMINOPHEN 1 TABLET: 5; 325 TABLET ORAL at 23:49

## 2024-05-24 RX ADMIN — HYDROMORPHONE HYDROCHLORIDE 0.5 MG: 1 INJECTION, SOLUTION INTRAMUSCULAR; INTRAVENOUS; SUBCUTANEOUS at 15:10

## 2024-05-24 RX ADMIN — HEPARIN SODIUM 5000 UNITS: 5000 INJECTION INTRAVENOUS; SUBCUTANEOUS at 18:11

## 2024-05-24 ASSESSMENT — PAIN SCALES - GENERAL
PAINLEVEL_OUTOF10: 0
PAINLEVEL_OUTOF10: 0
PAINLEVEL_OUTOF10: 3
PAINLEVEL_OUTOF10: 10
PAINLEVEL_OUTOF10: 5

## 2024-05-24 ASSESSMENT — PAIN - FUNCTIONAL ASSESSMENT: PAIN_FUNCTIONAL_ASSESSMENT: 0-10

## 2024-05-24 ASSESSMENT — PAIN DESCRIPTION - DESCRIPTORS
DESCRIPTORS: ACHING
DESCRIPTORS: DULL

## 2024-05-24 ASSESSMENT — PAIN DESCRIPTION - LOCATION
LOCATION: LEG
LOCATION: HIP

## 2024-05-24 ASSESSMENT — PAIN DESCRIPTION - ORIENTATION
ORIENTATION: RIGHT
ORIENTATION: RIGHT

## 2024-05-24 NOTE — ED NOTES
TRANSFER - OUT REPORT:    Verbal report given to TRANSFER - OUT REPORT:    Verbal report given to RAPHAEL Rowell on Carmen Ledesma  being transferred to  for routine progression of patient care       Report consisted of patient's Situation, Background, Assessment and   Recommendations(SBAR).     Information from the following report(s) Nurse Handoff Report, ED Encounter Summary, ED SBAR, Intake/Output, MAR, and Recent Results was reviewed with the receiving nurse.    Kinder Fall Assessment:                           Lines:   Peripheral IV 05/24/24 Left Antecubital (Active)        Opportunity for questions and clarification was provided.      Patient transported with:  Registered Nurse and Tech       on Carmen Ledesma  being transferred to RAPHAEL Rowell for routine progression of patient care       Report consisted of patient's Situation, Background, Assessment and   Recommendations(SBAR).     Information from the following report(s) Nurse Handoff Report, ED Encounter Summary, ED SBAR, Intake/Output, MAR, Recent Results, and Neuro Assessment was reviewed with the receiving nurse.    Kinder Fall Assessment:                           Lines:   Peripheral IV 05/24/24 Left Antecubital (Active)        Opportunity for questions and clarification was provided.      Patient transported with:  Monitor

## 2024-05-24 NOTE — H&P
edema, cyanosis or claudication.   Endo: No heat/cold intolerance, no polyuria,polydipsia or polyphagia.   Neuro: No unilateral weakness, numbness, tingling. No seizures.   Heme: No easy bruising or bleeding.          Physical Exam:     Physical Exam:  BP (!) 186/79   Pulse 91   Temp 98.8 °F (37.1 °C) (Oral)   Resp 16   Ht 1.626 m (5' 4\")   Wt 59.4 kg (131 lb)   SpO2 99%   BMI 22.49 kg/m²         Temp (24hrs), Av.8 °F (37.1 °C), Min:98.8 °F (37.1 °C), Max:98.8 °F (37.1 °C)    No intake/output data recorded.   No intake/output data recorded.    General:  Awake, cooperative, no distress.   Head:  Normocephalic, without obvious abnormality, atraumatic.   Eyes:  Conjunctivae/corneas clear, sclera anicteric, PERRL, EOMs intact.   Nose: Nares normal. No drainage or sinus tenderness.   Throat: Lips, mucosa, and tongue normal. .   Neck: Supple, symmetrical, trachea midline, no adenopathy.       Lungs:   Clear to auscultation bilaterally.       Heart:  Regular rate and rhythm, S1, S2 normal, no murmur, click, rub or gallop.     Abdomen: Soft, non-tender. Bowel sounds normal. No masses,  No organomegaly. PD catheter noted    Extremities: Extremities normal, atraumatic, no cyanosis or edema.   Pulses: 2+ and symmetric all extremities. Rt hip rom limited    Skin: Skin color-pink, texture, turgor normal. No rashes or lesions. Capillary refill normal    Neurologic: CNII-XII intact. No focal motor or sensory deficit.       Labs Reviewed:       BMP:   Lab Results   Component Value Date/Time     2024 12:38 PM    K 4.7 2024 12:38 PM     2024 12:38 PM    CO2 28 2024 12:38 PM    BUN 60 2024 12:38 PM    CREATININE 6.88 2024 12:38 PM    GLUCOSE 194 2024 12:38 PM    CALCIUM 8.5 2024 12:38 PM      Last 3 BMP:   Recent Labs     24  1238      K 4.7      CO2 28   BUN 60*   CREATININE 6.88*   GLUCOSE 194*   CALCIUM 8.5    CMP:   Lab Results   Component

## 2024-05-24 NOTE — ED TRIAGE NOTES
Pt BIBA [facility] c/o right leg pain following fall on 5/21. Per EMS, pt uses rollator walker at home and she fell with no LOC or other trauma at the time. Per EMS. Pt is not on blood thinners.  en route. Pt reports pain only with pressure or movement. Pt points to pain being on anterior thigh.

## 2024-05-24 NOTE — ED PROVIDER NOTES
femoral neck.    Would also consider a hip strain, hip contusion, hip sprain, etc.    No other injuries on secondary survey, patient denies any head trauma loss of consciousness or other areas of pain or any other injuries           ED Course:         ED Course as of 05/28/24 1916   Fri May 24, 2024   1343 XR HIP 2-3 VW W PELVIS RIGHT     IMPRESSION:  Right subcapital femoral neck fracture, versus ring of osteophytes. Suboptimally  positioned radiographs.   [SHAGGY]   1343 Although a hip x-ray leaves some doubt into terms of fracture.  Patient cannot move the leg and is in severe pain with attempted ambulation.  I suspect this is likely true fracture.  I will contact orthopedics for further guidance and recommendations [SHAGGY]   1349 Discussed with orthopedic surgery Dr. Rebolledo.  Given the question left on CT scan as fracture versus osteophytes.  He had recommended getting a CT scan to evaluate the hip further. [SHAGGY]   1536 CT of the hip shows femoral neck fracture. [SHAGGY]   1536 EKG interpreted by me sinus rhythm rate of 90 bpm, first-degree block no ST elevation or ST depression.  Overall sinus rhythm with first-degree block no acute ischemic changes [SHAGGY]      ED Course User Index  [SHAGGY] Tyrel Harrell DO     Sepsis Reassessment: Sepsis reassessment not applicable  ------------------------------------------------------------------------------------------------------------  SCREENINGS             No data recorded          Consultations:     CONSULTS:  IP CONSULT TO ORTHOPEDIC SURGERY  IP CONSULT TO NEPHROLOGY  IP CONSULT TO CARDIOLOGY  IP CONSULT TO SPIRITUAL SERVICES    See the ED course section or [facility], if applicable for details on the content of consultations requested.    Procedures and Critical Care       Performed by: Tyrel Harrell DO    Procedures             CRITICAL CARE NOTE :  7:16 PM  CRITICAL CARE TIME: CRITICAL CARE NOTE:    I have spent 31 minutes of critical care time involved in lab review, consultations with

## 2024-05-25 ENCOUNTER — APPOINTMENT (OUTPATIENT)
Facility: HOSPITAL | Age: 69
DRG: 480 | End: 2024-05-25
Attending: INTERNAL MEDICINE
Payer: MEDICARE

## 2024-05-25 ENCOUNTER — APPOINTMENT (OUTPATIENT)
Facility: HOSPITAL | Age: 69
DRG: 480 | End: 2024-05-25
Attending: HOSPITALIST
Payer: MEDICARE

## 2024-05-25 LAB
ANION GAP SERPL CALC-SCNC: 10 MMOL/L (ref 3–18)
BASOPHILS # BLD: 0 K/UL (ref 0–0.1)
BASOPHILS NFR BLD: 0 % (ref 0–2)
BUN SERPL-MCNC: 52 MG/DL (ref 7–18)
BUN/CREAT SERPL: 8 (ref 12–20)
CALCIUM SERPL-MCNC: 8.7 MG/DL (ref 8.5–10.1)
CHLORIDE SERPL-SCNC: 103 MMOL/L (ref 100–111)
CO2 SERPL-SCNC: 24 MMOL/L (ref 21–32)
CREAT SERPL-MCNC: 6.39 MG/DL (ref 0.6–1.3)
DIFFERENTIAL METHOD BLD: ABNORMAL
ECHO AO ROOT DIAM: 3 CM
ECHO AO ROOT INDEX: 1.84 CM/M2
ECHO AV AREA PEAK VELOCITY: 1.7 CM2
ECHO AV AREA VTI: 1.7 CM2
ECHO AV AREA/BSA PEAK VELOCITY: 1 CM2/M2
ECHO AV AREA/BSA VTI: 1 CM2/M2
ECHO AV MEAN GRADIENT: 4 MMHG
ECHO AV MEAN VELOCITY: 0.9 M/S
ECHO AV PEAK GRADIENT: 7 MMHG
ECHO AV PEAK VELOCITY: 1.3 M/S
ECHO AV VELOCITY RATIO: 0.46
ECHO AV VTI: 31.3 CM
ECHO BSA: 1.64 M2
ECHO BSA: 1.64 M2
ECHO LA VOL A-L A4C: 45 ML (ref 22–52)
ECHO LA VOL MOD A4C: 42 ML (ref 22–52)
ECHO LA VOLUME INDEX A-L A4C: 28 ML/M2 (ref 16–34)
ECHO LA VOLUME INDEX MOD A4C: 26 ML/M2 (ref 16–34)
ECHO LV E' LATERAL VELOCITY: 7 CM/S
ECHO LV E' SEPTAL VELOCITY: 6 CM/S
ECHO LV EDV A4C: 103 ML
ECHO LV EDV INDEX A4C: 63 ML/M2
ECHO LV EJECTION FRACTION A4C: 60 %
ECHO LV ESV A4C: 41 ML
ECHO LV ESV INDEX A4C: 25 ML/M2
ECHO LV FRACTIONAL SHORTENING: 33 % (ref 28–44)
ECHO LV INTERNAL DIMENSION DIASTOLE INDEX: 2.45 CM/M2
ECHO LV INTERNAL DIMENSION DIASTOLIC: 4 CM (ref 3.9–5.3)
ECHO LV INTERNAL DIMENSION SYSTOLIC INDEX: 1.66 CM/M2
ECHO LV INTERNAL DIMENSION SYSTOLIC: 2.7 CM
ECHO LV IVSD: 0.7 CM (ref 0.6–0.9)
ECHO LV MASS 2D: 78.4 G (ref 67–162)
ECHO LV MASS INDEX 2D: 48.1 G/M2 (ref 43–95)
ECHO LV POSTERIOR WALL DIASTOLIC: 0.7 CM (ref 0.6–0.9)
ECHO LV RELATIVE WALL THICKNESS RATIO: 0.35
ECHO LVOT AREA: 3.8 CM2
ECHO LVOT AV VTI INDEX: 0.45
ECHO LVOT DIAM: 2.2 CM
ECHO LVOT MEAN GRADIENT: 1 MMHG
ECHO LVOT PEAK GRADIENT: 1 MMHG
ECHO LVOT PEAK VELOCITY: 0.6 M/S
ECHO LVOT STROKE VOLUME INDEX: 32.9 ML/M2
ECHO LVOT SV: 53.6 ML
ECHO LVOT VTI: 14.1 CM
ECHO MV A VELOCITY: 0.81 M/S
ECHO MV E DECELERATION TIME (DT): 239.1 MS
ECHO MV E VELOCITY: 0.76 M/S
ECHO MV E/A RATIO: 0.94
ECHO MV E/E' LATERAL: 10.86
ECHO MV E/E' RATIO (AVERAGED): 11.76
ECHO MV E/E' SEPTAL: 12.67
ECHO RV FREE WALL PEAK S': 10 CM/S
ECHO RV TAPSE: 1.9 CM (ref 1.7–?)
EKG ATRIAL RATE: 91 BPM
EKG DIAGNOSIS: NORMAL
EKG P AXIS: 8 DEGREES
EKG P-R INTERVAL: 256 MS
EKG Q-T INTERVAL: 376 MS
EKG QRS DURATION: 76 MS
EKG QTC CALCULATION (BAZETT): 462 MS
EKG R AXIS: 44 DEGREES
EKG T AXIS: 60 DEGREES
EKG VENTRICULAR RATE: 91 BPM
EOSINOPHIL # BLD: 0.3 K/UL (ref 0–0.4)
EOSINOPHIL NFR BLD: 3 % (ref 0–5)
ERYTHROCYTE [DISTWIDTH] IN BLOOD BY AUTOMATED COUNT: 13 % (ref 11.6–14.5)
GLUCOSE BLD STRIP.AUTO-MCNC: 178 MG/DL (ref 70–110)
GLUCOSE BLD STRIP.AUTO-MCNC: 249 MG/DL (ref 70–110)
GLUCOSE BLD STRIP.AUTO-MCNC: 298 MG/DL (ref 70–110)
GLUCOSE BLD STRIP.AUTO-MCNC: 306 MG/DL (ref 70–110)
GLUCOSE SERPL-MCNC: 231 MG/DL (ref 74–99)
HCT VFR BLD AUTO: 27.9 % (ref 35–45)
HGB BLD-MCNC: 9.1 G/DL (ref 12–16)
IMM GRANULOCYTES # BLD AUTO: 0 K/UL (ref 0–0.04)
IMM GRANULOCYTES NFR BLD AUTO: 0 % (ref 0–0.5)
LYMPHOCYTES # BLD: 2.2 K/UL (ref 0.9–3.6)
LYMPHOCYTES NFR BLD: 25 % (ref 21–52)
MAGNESIUM SERPL-MCNC: 2 MG/DL (ref 1.6–2.6)
MCH RBC QN AUTO: 31.5 PG (ref 24–34)
MCHC RBC AUTO-ENTMCNC: 32.6 G/DL (ref 31–37)
MCV RBC AUTO: 96.5 FL (ref 78–100)
MONOCYTES # BLD: 0.9 K/UL (ref 0.05–1.2)
MONOCYTES NFR BLD: 10 % (ref 3–10)
NEUTS SEG # BLD: 5.5 K/UL (ref 1.8–8)
NEUTS SEG NFR BLD: 62 % (ref 40–73)
NRBC # BLD: 0 K/UL (ref 0–0.01)
NRBC BLD-RTO: 0 PER 100 WBC
PLATELET # BLD AUTO: 294 K/UL (ref 135–420)
PMV BLD AUTO: 10.8 FL (ref 9.2–11.8)
POTASSIUM SERPL-SCNC: 3.8 MMOL/L (ref 3.5–5.5)
RBC # BLD AUTO: 2.89 M/UL (ref 4.2–5.3)
SODIUM SERPL-SCNC: 137 MMOL/L (ref 136–145)
WBC # BLD AUTO: 8.9 K/UL (ref 4.6–13.2)

## 2024-05-25 PROCEDURE — 82962 GLUCOSE BLOOD TEST: CPT

## 2024-05-25 PROCEDURE — 6370000000 HC RX 637 (ALT 250 FOR IP): Performed by: INTERNAL MEDICINE

## 2024-05-25 PROCEDURE — 6360000002 HC RX W HCPCS: Performed by: STUDENT IN AN ORGANIZED HEALTH CARE EDUCATION/TRAINING PROGRAM

## 2024-05-25 PROCEDURE — 85025 COMPLETE CBC W/AUTO DIFF WBC: CPT

## 2024-05-25 PROCEDURE — 6360000004 HC RX CONTRAST MEDICATION: Performed by: HOSPITALIST

## 2024-05-25 PROCEDURE — 6360000002 HC RX W HCPCS: Performed by: INTERNAL MEDICINE

## 2024-05-25 PROCEDURE — 1100000003 HC PRIVATE W/ TELEMETRY

## 2024-05-25 PROCEDURE — 6360000002 HC RX W HCPCS: Performed by: HOSPITALIST

## 2024-05-25 PROCEDURE — 99223 1ST HOSP IP/OBS HIGH 75: CPT | Performed by: INTERNAL MEDICINE

## 2024-05-25 PROCEDURE — 36415 COLL VENOUS BLD VENIPUNCTURE: CPT

## 2024-05-25 PROCEDURE — C8929 TTE W OR WO FOL WCON,DOPPLER: HCPCS

## 2024-05-25 PROCEDURE — 6370000000 HC RX 637 (ALT 250 FOR IP): Performed by: HOSPITALIST

## 2024-05-25 PROCEDURE — 93970 EXTREMITY STUDY: CPT

## 2024-05-25 PROCEDURE — A4722 DIALYS SOL FLD VOL > 1999CC: HCPCS | Performed by: STUDENT IN AN ORGANIZED HEALTH CARE EDUCATION/TRAINING PROGRAM

## 2024-05-25 PROCEDURE — 83735 ASSAY OF MAGNESIUM: CPT

## 2024-05-25 PROCEDURE — 80048 BASIC METABOLIC PNL TOTAL CA: CPT

## 2024-05-25 PROCEDURE — 2580000003 HC RX 258: Performed by: HOSPITALIST

## 2024-05-25 RX ORDER — INSULIN GLARGINE 100 [IU]/ML
5 INJECTION, SOLUTION SUBCUTANEOUS NIGHTLY
Status: DISCONTINUED | OUTPATIENT
Start: 2024-05-25 | End: 2024-05-25

## 2024-05-25 RX ORDER — OXYCODONE AND ACETAMINOPHEN 10; 325 MG/1; MG/1
1 TABLET ORAL EVERY 4 HOURS PRN
Status: DISCONTINUED | OUTPATIENT
Start: 2024-05-25 | End: 2024-05-29 | Stop reason: HOSPADM

## 2024-05-25 RX ORDER — CALCITRIOL 0.25 UG/1
0.25 CAPSULE, LIQUID FILLED ORAL DAILY
Status: DISCONTINUED | OUTPATIENT
Start: 2024-05-25 | End: 2024-05-29 | Stop reason: HOSPADM

## 2024-05-25 RX ORDER — INSULIN GLARGINE 100 [IU]/ML
5 INJECTION, SOLUTION SUBCUTANEOUS DAILY
Status: DISCONTINUED | OUTPATIENT
Start: 2024-05-25 | End: 2024-05-29 | Stop reason: HOSPADM

## 2024-05-25 RX ADMIN — INSULIN LISPRO 6 UNITS: 100 INJECTION, SOLUTION INTRAVENOUS; SUBCUTANEOUS at 12:02

## 2024-05-25 RX ADMIN — SODIUM CHLORIDE, PRESERVATIVE FREE 10 ML: 5 INJECTION INTRAVENOUS at 23:58

## 2024-05-25 RX ADMIN — CALCITRIOL CAPSULES 0.25 MCG 0.25 MCG: 0.25 CAPSULE ORAL at 15:45

## 2024-05-25 RX ADMIN — HEPARIN SODIUM 5000 UNITS: 5000 INJECTION INTRAVENOUS; SUBCUTANEOUS at 13:32

## 2024-05-25 RX ADMIN — SODIUM CHLORIDE, PRESERVATIVE FREE 10 ML: 5 INJECTION INTRAVENOUS at 08:08

## 2024-05-25 RX ADMIN — OXYCODONE AND ACETAMINOPHEN 1 TABLET: 10; 325 TABLET ORAL at 20:07

## 2024-05-25 RX ADMIN — SODIUM CHLORIDE, SODIUM LACTATE, CALCIUM CHLORIDE, MAGNESIUM CHLORIDE AND DEXTROSE 2000 ML: 1.5; 538; 448; 18.3; 5.08 INJECTION, SOLUTION INTRAPERITONEAL at 13:10

## 2024-05-25 RX ADMIN — ONDANSETRON 4 MG: 2 INJECTION INTRAMUSCULAR; INTRAVENOUS at 15:58

## 2024-05-25 RX ADMIN — SODIUM CHLORIDE, SODIUM LACTATE, CALCIUM CHLORIDE, MAGNESIUM CHLORIDE AND DEXTROSE 2000 ML: 1.5; 538; 448; 18.3; 5.08 INJECTION, SOLUTION INTRAPERITONEAL at 21:51

## 2024-05-25 RX ADMIN — SODIUM CHLORIDE, SODIUM LACTATE, CALCIUM CHLORIDE, MAGNESIUM CHLORIDE AND DEXTROSE 2000 ML: 1.5; 538; 448; 18.3; 5.08 INJECTION, SOLUTION INTRAPERITONEAL at 07:37

## 2024-05-25 RX ADMIN — INSULIN LISPRO 4 UNITS: 100 INJECTION, SOLUTION INTRAVENOUS; SUBCUTANEOUS at 08:23

## 2024-05-25 RX ADMIN — ATENOLOL 25 MG: 50 TABLET ORAL at 10:22

## 2024-05-25 RX ADMIN — HEPARIN SODIUM 5000 UNITS: 5000 INJECTION INTRAVENOUS; SUBCUTANEOUS at 06:03

## 2024-05-25 RX ADMIN — EPOETIN ALFA-EPBX 6000 UNITS: 3000 INJECTION, SOLUTION INTRAVENOUS; SUBCUTANEOUS at 15:45

## 2024-05-25 RX ADMIN — SODIUM CHLORIDE, SODIUM LACTATE, CALCIUM CHLORIDE, MAGNESIUM CHLORIDE AND DEXTROSE 2000 ML: 1.5; 538; 448; 18.3; 5.08 INJECTION, SOLUTION INTRAPERITONEAL at 18:07

## 2024-05-25 RX ADMIN — INSULIN GLARGINE 5 UNITS: 100 INJECTION, SOLUTION SUBCUTANEOUS at 13:30

## 2024-05-25 RX ADMIN — OXYCODONE HYDROCHLORIDE AND ACETAMINOPHEN 1 TABLET: 5; 325 TABLET ORAL at 04:29

## 2024-05-25 RX ADMIN — OXYCODONE AND ACETAMINOPHEN 1 TABLET: 10; 325 TABLET ORAL at 23:57

## 2024-05-25 RX ADMIN — POLYETHYLENE GLYCOL 3350 17 G: 17 POWDER, FOR SOLUTION ORAL at 04:11

## 2024-05-25 RX ADMIN — OXYCODONE AND ACETAMINOPHEN 1 TABLET: 10; 325 TABLET ORAL at 11:26

## 2024-05-25 RX ADMIN — HYDROMORPHONE HYDROCHLORIDE 1 MG: 1 INJECTION, SOLUTION INTRAMUSCULAR; INTRAVENOUS; SUBCUTANEOUS at 05:31

## 2024-05-25 RX ADMIN — PERFLUTREN 1.5 ML: 6.52 INJECTION, SUSPENSION INTRAVENOUS at 10:52

## 2024-05-25 RX ADMIN — OXYCODONE AND ACETAMINOPHEN 1 TABLET: 10; 325 TABLET ORAL at 15:58

## 2024-05-25 RX ADMIN — SODIUM CHLORIDE, SODIUM LACTATE, CALCIUM CHLORIDE, MAGNESIUM CHLORIDE AND DEXTROSE 2000 ML: 1.5; 538; 448; 18.3; 5.08 INJECTION, SOLUTION INTRAPERITONEAL at 00:45

## 2024-05-25 RX ADMIN — HYDRALAZINE HYDROCHLORIDE 10 MG: 20 INJECTION INTRAMUSCULAR; INTRAVENOUS at 08:24

## 2024-05-25 RX ADMIN — ONDANSETRON 4 MG: 2 INJECTION INTRAMUSCULAR; INTRAVENOUS at 08:00

## 2024-05-25 ASSESSMENT — PAIN - FUNCTIONAL ASSESSMENT
PAIN_FUNCTIONAL_ASSESSMENT: ACTIVITIES ARE NOT PREVENTED
PAIN_FUNCTIONAL_ASSESSMENT: PREVENTS OR INTERFERES WITH MANY ACTIVE NOT PASSIVE ACTIVITIES

## 2024-05-25 ASSESSMENT — PAIN SCALES - GENERAL
PAINLEVEL_OUTOF10: 3
PAINLEVEL_OUTOF10: 3
PAINLEVEL_OUTOF10: 2
PAINLEVEL_OUTOF10: 3
PAINLEVEL_OUTOF10: 2
PAINLEVEL_OUTOF10: 8
PAINLEVEL_OUTOF10: 5
PAINLEVEL_OUTOF10: 10
PAINLEVEL_OUTOF10: 5
PAINLEVEL_OUTOF10: 10
PAINLEVEL_OUTOF10: 4
PAINLEVEL_OUTOF10: 8
PAINLEVEL_OUTOF10: 4
PAINLEVEL_OUTOF10: 10

## 2024-05-25 ASSESSMENT — PAIN DESCRIPTION - DESCRIPTORS
DESCRIPTORS: ACHING;SORE
DESCRIPTORS: ACHING;THROBBING
DESCRIPTORS: ACHING;THROBBING
DESCRIPTORS: ACHING

## 2024-05-25 ASSESSMENT — PAIN DESCRIPTION - LOCATION
LOCATION: HIP
LOCATION: LEG;FOOT
LOCATION: HIP;LEG
LOCATION: LEG;HIP

## 2024-05-25 ASSESSMENT — PAIN DESCRIPTION - ORIENTATION
ORIENTATION: LEFT
ORIENTATION: RIGHT

## 2024-05-25 ASSESSMENT — PAIN DESCRIPTION - FREQUENCY: FREQUENCY: CONTINUOUS

## 2024-05-25 ASSESSMENT — PAIN SCALES - WONG BAKER
WONGBAKER_NUMERICALRESPONSE: HURTS A LITTLE BIT
WONGBAKER_NUMERICALRESPONSE: HURTS A LITTLE BIT

## 2024-05-25 NOTE — CONSULTS
RENAL CONSULT  2024    Patient:  Carmen Ledesma  :  1955  Gender:  female  MRN #:  [id number]    Consulting Physician:  Tyrel Adams DO,  Assessment:    )Principal Problem:    Closed right hip fracture, initial encounter ([facility])  Active Problems:    Essential hypertension    Diabetes mellitus (HCC)    ESRD on peritoneal dialysis ([facility])    Fall  Resolved Problems:    * No resolved hospital problems. *      Plan:    Epogen for anemia of ESRD  Calcitriol  Iron stores  Glycemic control as per hospitalits  . Changed the PD prescription times  D/w with Nurse.   If going to OR, PD fluid should be drained before OR.         History of Present Illness:  Carmen Ledesma is a 68 y.o. year old female with ongoing DM and ESRD has been admitted for hip fracture, I was asked to see.  Pt has no symptoms  Good UF with 1.5% dextrose  Sugars are being managed.         Past Medical History:   Diagnosis Date    CKD (chronic kidney disease)     DM (diabetes mellitus) (HCC)     HTN (hypertension)      Past Surgical History:   Procedure Laterality Date    IR TUNNELED CATHETER PLACEMENT GREATER THAN 5 YEARS  2023    IR TUNNELED CATHETER PLACEMENT GREATER THAN 5 YEARS 2023 [facility] IR     Family History   Problem Relation Age of Onset    Diabetes Mother      Allergies   Allergen Reactions    Metformin And Related Other (See Comments)     \"Lactic acidosis\"     Current Facility-Administered Medications   Medication Dose Route Frequency Provider Last Rate Last Admin    oxyCODONE-acetaminophen (PERCOCET)  MG per tablet 1 tablet  1 tablet Oral Q4H PRN Sharon Tovar MD   1 tablet at 24 1126    epoetin lakeisha-epbx (RETACRIT) injection 6,000 Units  6,000 Units SubCUTAneous Once per day on  Sat Tyrel Adams DO        calcitRIOL (ROCALTROL) capsule 0.25 mcg  0.25 mcg Oral Daily Tyrel Adams DO        insulin glargine (LANTUS) injection vial 5 Units  5 Units SubCUTAneous Daily Sharon Tovar MD        
Transportation (Non-Medical): No   Housing Stability: Low Risk  (5/24/2024)    Housing Stability Vital Sign     Unable to Pay for Housing in the Last Year: No     Number of Places Lived in the Last Year: 1     Unstable Housing in the Last Year: No       Prior to Admission medications    Medication Sig Start Date End Date Taking? Authorizing Provider   epoetin lakeisha-epbx (RETACRIT) 09930 UNIT/ML SOLN injection Inject 1 mL into the skin Every Monday, Wednesday, and Friday With dialysis 12/11/23   Kimberley Rojas MD   metoclopramide (REGLAN) 5 MG tablet Take 1 tablet by mouth 3 times daily (before meals) 12/9/23   Kimberley Rojas MD       Allergies   Allergen Reactions    Metformin And Related Other (See Comments)     \"Lactic acidosis\"       Review of Systems  Pertinent items are noted in the History of Present Illness.      Physical Exam:      Visit Vitals  BP (!) 153/82   Pulse 81   Temp 98.3 °F (36.8 °C) (Temporal)   Resp 18   Ht 1.626 m (5' 4\")   Wt 59.4 kg (131 lb)   SpO2 100%   BMI 22.49 kg/m²       Physical Exam:  General: Alert and Oriented X 3  Lungs: Clear to ausculation bilaterally  Cardiovascular: Regular Rate and Rhythm, without murmur  Abdomen: Soft, nontender with positive bowel sounds in all four quadrants  Gential/Rectal: deferred  Musculoskeletal: tenderness to palpation noted along anterior and lateral hip. No obvious ecchymosis or bruising appreciated. Gross sensation intact bilateral lower extremities. Palpable DP and PT pulses. Increased right hip pain with gentle log roll      Labs Reviewed:     Latest Reference Range & Units 05/25/24 03:45   Sodium 136 - 145 mmol/L 137   Potassium 3.5 - 5.5 mmol/L 3.8   Chloride 100 - 111 mmol/L 103   CARBON DIOXIDE 21 - 32 mmol/L 24   BUN,BUNPL 7.0 - 18 MG/DL 52 (H)   Creatinine 0.6 - 1.3 MG/DL 6.39 (H)   Bun/Cre 12 - 20   8 (L)   Anion Gap 3.0 - 18 mmol/L 10   Est, Glom Filt Rate >60 ml/min/1.73m2 7 (L)   Magnesium 1.6 - 2.6 mg/dL 2.0   Glucose 74 
No   Sig: Take 1 tablet by mouth 3 times daily (before meals)      Facility-Administered Medications: None       Review of Systems:   Review of Symptoms: Please see ED and admission/consult notes which I have reviewed; otherwise as below.  Constitutional:  No Fever; No chills; No weight loss    Skin:  No rash; No itching   HEENT:  No changes in vision or hearing   Cardiovascular:  See HPI    Respiratory:  See HPI    Gastrointestinal:  No nausea or vomiting; No diarrhea   Genitourinary:  No dysuria; No increase in urinary frequency   Musculoskeletal:  No weakness or muscle pains   Endo:  No polyuria; no symptoms of thyroid dysfunction   Heme:  No bruising; No bleeding   Neurological:  No Seizures; No focal weakness or sensory changes   Psychiatric:  No mood changes; no suicidal ideation      Physical Examination:   HEENT: Perrla, EOMI; oral mucosa well perfused; conjunctiva not injected, sclera anicteric, no thrush or xanthelasma  Neck: No JVD, negative carotid bruits, normal pulses; no thyromegaly, trachea midline  Resp: Clear to auscultation bilaterally; No wheezes or rales, normal effort no accessory muscle use  Cardiovascular: regular rhythm normal s1and s2; + murmurs or rubs; PMI not displaced  Carotid pulses 2+,no bruit, no abdominal bruit, palpable femoral and DP pulses  Abd: Positive Bowel Sounds, Soft, Nontender nondistended; No hepatosplenomegaly  Ext: No clubbing, cyanosis, no edema warm and well perfused.  Right leg externally rotated.  Right hip tender.  Pulses: 2+ DP/PT/Rad  Neuro: Alert and oriented X3, Nonfocal; Muscle strength and tone symmetric, moves all 4 extremities  Skin: Warm, Dry, Intact    Labs:   GFR: Estimated Creatinine Clearance: 7 mL/min (A) (based on SCr of 6.39 mg/dL (H)).     Lab Results   Component Value Date/Time    WBC 8.9 05/25/2024 03:45 AM    RBC 2.89 (L) 05/25/2024 03:45 AM    HGB 9.1 (L) 05/25/2024 03:45 AM    HCT 27.9 (L) 05/25/2024 03:45 AM    MCV 96.5 05/25/2024 03:45 AM

## 2024-05-25 NOTE — CARE COORDINATION
05/25/24 1135   Service Assessment   Patient Orientation Alert and Oriented   Cognition Alert   History Provided By Patient   Primary Caregiver Self   Support Systems Children   PCP Verified by CM Yes   Last Visit to PCP Within last 6 months  (also sees renal MD - PD patient)   Prior Functional Level Independent in ADLs/IADLs   Current Functional Level Assistance with the following:;Bathing;Dressing;Toileting;Cooking;Housework;Shopping;Mobility   Can patient return to prior living arrangement Yes   Ability to make needs known: Good   Family able to assist with home care needs: Yes   Financial Resources Medicare   CM/SW Referral ADLs/IADLs   Social/Functional History   Lives With Daughter   Type of Home Apartment   Home Layout One level   Home Equipment Walker - Rolling   ADL Assistance Needs assistance   Toileting Needs assistance   Homemaking Assistance Needs assistance   Homemaking Responsibilities Yes   Ambulation Assistance Needs assistance   Transfer Assistance Needs assistance   Active  Yes   Discharge Planning   Type of Residence Apartment   Living Arrangements Children   Current Services Prior To Admission None   Potential Assistance Needed N/A   DME Ordered? No   Potential Assistance Purchasing Medications No   Type of Home Care Services OT;PT   Patient expects to be discharged to: Apartment   One/Two Story Residence One story   History of falls? 1   Services At/After Discharge   Transition of Care Consult (CM Consult) Home Health   Services At/After Discharge Home Health   [place] Resource Information Provided? No   Mode of Transport at Discharge Self   Confirm Follow Up Transport Family   Condition of Participation: Discharge Planning   The Plan for Transition of Care is related to the following treatment goals: fall with hip fracture- repair     Care manager met with patient for initial assessment, noted plan is for surgical repair 5/25 - provided patient teaching regarding PT/OT evals and

## 2024-05-25 NOTE — H&P (VIEW-ONLY)
Consult Note    Patient: Carmen Ledesma               Sex: female          DOA: 5/24/2024         YOB: 1955      Age:  68 y.o.        LOS:  LOS: 1 day              HPI:     Carmen Ledesma is a 68 y.o. female who has been seen for right hip femoral neck fracture. She reports a fall in the parking lot after leaving her doctors appointment several days ago. She was able to weight bear and ambulate initially, but then significant pain began in the right hip and groin with inability to weight bear prompting evaluation in the ED. She generally ambulates at baseline with a walker due to neuropathy and deconditioning. She reports PMHx significant for diabetes, end stage kidney disease on PD, hypertension. She reports pain in the anterior hip and groin. Reports baseline of chronic numbness and tingling in lower extremities from neuropathy.    Past Medical History:   Diagnosis Date    CKD (chronic kidney disease)     DM (diabetes mellitus) (HCC)     HTN (hypertension)        Past Surgical History:   Procedure Laterality Date    IR TUNNELED CATHETER PLACEMENT GREATER THAN 5 YEARS  12/5/2023    IR TUNNELED CATHETER PLACEMENT GREATER THAN 5 YEARS 12/5/2023 [facility] IR       Family History   Problem Relation Age of Onset    Diabetes Mother        Social History     Socioeconomic History    Marital status: Single     Spouse name: None    Number of children: None    Years of education: None    Highest education level: None   Tobacco Use    Smoking status: Never   Substance and Sexual Activity    Alcohol use: Never    Drug use: Never     Social Determinants of Health     Food Insecurity: No Food Insecurity (5/24/2024)    Hunger Vital Sign     Worried About Running Out of Food in the Last Year: Never true     Ran Out of Food in the Last Year: Never true   Transportation Needs: No Transportation Needs (5/24/2024)    PRAPARE - Transportation     Lack of Transportation (Medical): No     Lack of

## 2024-05-26 ENCOUNTER — APPOINTMENT (OUTPATIENT)
Facility: HOSPITAL | Age: 69
DRG: 480 | End: 2024-05-26
Payer: MEDICARE

## 2024-05-26 ENCOUNTER — ANESTHESIA (OUTPATIENT)
Facility: HOSPITAL | Age: 69
End: 2024-05-26
Payer: MEDICARE

## 2024-05-26 ENCOUNTER — ANESTHESIA EVENT (OUTPATIENT)
Facility: HOSPITAL | Age: 69
End: 2024-05-26
Payer: MEDICARE

## 2024-05-26 LAB
ANION GAP SERPL CALC-SCNC: 8 MMOL/L (ref 3–18)
BASOPHILS # BLD: 0 K/UL (ref 0–0.1)
BASOPHILS NFR BLD: 0 % (ref 0–2)
BUN SERPL-MCNC: 44 MG/DL (ref 7–18)
BUN/CREAT SERPL: 8 (ref 12–20)
CALCIUM SERPL-MCNC: 8.6 MG/DL (ref 8.5–10.1)
CHLORIDE SERPL-SCNC: 102 MMOL/L (ref 100–111)
CO2 SERPL-SCNC: 27 MMOL/L (ref 21–32)
CREAT SERPL-MCNC: 5.75 MG/DL (ref 0.6–1.3)
DIFFERENTIAL METHOD BLD: ABNORMAL
EOSINOPHIL # BLD: 0.1 K/UL (ref 0–0.4)
EOSINOPHIL NFR BLD: 1 % (ref 0–5)
ERYTHROCYTE [DISTWIDTH] IN BLOOD BY AUTOMATED COUNT: 13.1 % (ref 11.6–14.5)
GLUCOSE BLD STRIP.AUTO-MCNC: 208 MG/DL (ref 70–110)
GLUCOSE BLD STRIP.AUTO-MCNC: 215 MG/DL (ref 70–110)
GLUCOSE BLD STRIP.AUTO-MCNC: 226 MG/DL (ref 70–110)
GLUCOSE BLD STRIP.AUTO-MCNC: 230 MG/DL (ref 70–110)
GLUCOSE BLD STRIP.AUTO-MCNC: 236 MG/DL (ref 70–110)
GLUCOSE BLD STRIP.AUTO-MCNC: 299 MG/DL (ref 70–110)
GLUCOSE SERPL-MCNC: 226 MG/DL (ref 74–99)
HCT VFR BLD AUTO: 25.6 % (ref 35–45)
HGB BLD-MCNC: 8.5 G/DL (ref 12–16)
IMM GRANULOCYTES # BLD AUTO: 0 K/UL (ref 0–0.04)
IMM GRANULOCYTES NFR BLD AUTO: 0 % (ref 0–0.5)
IRON SATN MFR SERPL: 39 % (ref 20–50)
IRON SERPL-MCNC: 66 UG/DL (ref 50–175)
LYMPHOCYTES # BLD: 2.3 K/UL (ref 0.9–3.6)
LYMPHOCYTES NFR BLD: 23 % (ref 21–52)
MAGNESIUM SERPL-MCNC: 2 MG/DL (ref 1.6–2.6)
MCH RBC QN AUTO: 31.5 PG (ref 24–34)
MCHC RBC AUTO-ENTMCNC: 33.2 G/DL (ref 31–37)
MCV RBC AUTO: 94.8 FL (ref 78–100)
MONOCYTES # BLD: 0.8 K/UL (ref 0.05–1.2)
MONOCYTES NFR BLD: 9 % (ref 3–10)
NEUTS SEG # BLD: 6.4 K/UL (ref 1.8–8)
NEUTS SEG NFR BLD: 67 % (ref 40–73)
NRBC # BLD: 0 K/UL (ref 0–0.01)
NRBC BLD-RTO: 0 PER 100 WBC
PLATELET # BLD AUTO: 337 K/UL (ref 135–420)
PMV BLD AUTO: 10.5 FL (ref 9.2–11.8)
POTASSIUM SERPL-SCNC: 3.8 MMOL/L (ref 3.5–5.5)
RBC # BLD AUTO: 2.7 M/UL (ref 4.2–5.3)
SODIUM SERPL-SCNC: 137 MMOL/L (ref 136–145)
TIBC SERPL-MCNC: 170 UG/DL (ref 250–450)
WBC # BLD AUTO: 9.6 K/UL (ref 4.6–13.2)

## 2024-05-26 PROCEDURE — 2580000003 HC RX 258: Performed by: HOSPITALIST

## 2024-05-26 PROCEDURE — 2500000003 HC RX 250 WO HCPCS: Performed by: NURSE ANESTHETIST, CERTIFIED REGISTERED

## 2024-05-26 PROCEDURE — 3700000000 HC ANESTHESIA ATTENDED CARE: Performed by: ORTHOPAEDIC SURGERY

## 2024-05-26 PROCEDURE — C1713 ANCHOR/SCREW BN/BN,TIS/BN: HCPCS | Performed by: ORTHOPAEDIC SURGERY

## 2024-05-26 PROCEDURE — 2580000003 HC RX 258: Performed by: ORTHOPAEDIC SURGERY

## 2024-05-26 PROCEDURE — 36415 COLL VENOUS BLD VENIPUNCTURE: CPT

## 2024-05-26 PROCEDURE — 82962 GLUCOSE BLOOD TEST: CPT

## 2024-05-26 PROCEDURE — 6370000000 HC RX 637 (ALT 250 FOR IP): Performed by: INTERNAL MEDICINE

## 2024-05-26 PROCEDURE — 6370000000 HC RX 637 (ALT 250 FOR IP): Performed by: HOSPITALIST

## 2024-05-26 PROCEDURE — 3700000001 HC ADD 15 MINUTES (ANESTHESIA): Performed by: ORTHOPAEDIC SURGERY

## 2024-05-26 PROCEDURE — 6360000002 HC RX W HCPCS: Performed by: PHYSICIAN ASSISTANT

## 2024-05-26 PROCEDURE — 7100000001 HC PACU RECOVERY - ADDTL 15 MIN: Performed by: ORTHOPAEDIC SURGERY

## 2024-05-26 PROCEDURE — 6360000002 HC RX W HCPCS: Performed by: STUDENT IN AN ORGANIZED HEALTH CARE EDUCATION/TRAINING PROGRAM

## 2024-05-26 PROCEDURE — A4217 STERILE WATER/SALINE, 500 ML: HCPCS | Performed by: ORTHOPAEDIC SURGERY

## 2024-05-26 PROCEDURE — 80048 BASIC METABOLIC PNL TOTAL CA: CPT

## 2024-05-26 PROCEDURE — 2580000003 HC RX 258: Performed by: PHYSICIAN ASSISTANT

## 2024-05-26 PROCEDURE — 7100000000 HC PACU RECOVERY - FIRST 15 MIN: Performed by: ORTHOPAEDIC SURGERY

## 2024-05-26 PROCEDURE — 83735 ASSAY OF MAGNESIUM: CPT

## 2024-05-26 PROCEDURE — 6370000000 HC RX 637 (ALT 250 FOR IP): Performed by: PHYSICIAN ASSISTANT

## 2024-05-26 PROCEDURE — 83550 IRON BINDING TEST: CPT

## 2024-05-26 PROCEDURE — 6360000002 HC RX W HCPCS: Performed by: HOSPITALIST

## 2024-05-26 PROCEDURE — 6360000002 HC RX W HCPCS: Performed by: NURSE ANESTHETIST, CERTIFIED REGISTERED

## 2024-05-26 PROCEDURE — A4722 DIALYS SOL FLD VOL > 1999CC: HCPCS | Performed by: STUDENT IN AN ORGANIZED HEALTH CARE EDUCATION/TRAINING PROGRAM

## 2024-05-26 PROCEDURE — 85025 COMPLETE CBC W/AUTO DIFF WBC: CPT

## 2024-05-26 PROCEDURE — 3600000012 HC SURGERY LEVEL 2 ADDTL 15MIN: Performed by: ORTHOPAEDIC SURGERY

## 2024-05-26 PROCEDURE — 3600000002 HC SURGERY LEVEL 2 BASE: Performed by: ORTHOPAEDIC SURGERY

## 2024-05-26 PROCEDURE — 99232 SBSQ HOSP IP/OBS MODERATE 35: CPT | Performed by: INTERNAL MEDICINE

## 2024-05-26 PROCEDURE — 83540 ASSAY OF IRON: CPT

## 2024-05-26 PROCEDURE — 1100000003 HC PRIVATE W/ TELEMETRY

## 2024-05-26 PROCEDURE — 2709999900 HC NON-CHARGEABLE SUPPLY: Performed by: ORTHOPAEDIC SURGERY

## 2024-05-26 RX ORDER — MIDAZOLAM HYDROCHLORIDE 1 MG/ML
INJECTION INTRAMUSCULAR; INTRAVENOUS PRN
Status: DISCONTINUED | OUTPATIENT
Start: 2024-05-26 | End: 2024-05-26 | Stop reason: SDUPTHER

## 2024-05-26 RX ORDER — DROPERIDOL 2.5 MG/ML
INJECTION, SOLUTION INTRAMUSCULAR; INTRAVENOUS PRN
Status: DISCONTINUED | OUTPATIENT
Start: 2024-05-26 | End: 2024-05-26 | Stop reason: SDUPTHER

## 2024-05-26 RX ORDER — IPRATROPIUM BROMIDE AND ALBUTEROL SULFATE 2.5; .5 MG/3ML; MG/3ML
1 SOLUTION RESPIRATORY (INHALATION)
Status: DISCONTINUED | OUTPATIENT
Start: 2024-05-26 | End: 2024-05-26 | Stop reason: HOSPADM

## 2024-05-26 RX ORDER — PROCHLORPERAZINE EDISYLATE 5 MG/ML
5 INJECTION INTRAMUSCULAR; INTRAVENOUS
Status: DISCONTINUED | OUTPATIENT
Start: 2024-05-26 | End: 2024-05-26 | Stop reason: HOSPADM

## 2024-05-26 RX ORDER — DIPHENHYDRAMINE HYDROCHLORIDE 50 MG/ML
12.5 INJECTION INTRAMUSCULAR; INTRAVENOUS
Status: DISCONTINUED | OUTPATIENT
Start: 2024-05-26 | End: 2024-05-26 | Stop reason: HOSPADM

## 2024-05-26 RX ORDER — METOCLOPRAMIDE HYDROCHLORIDE 5 MG/ML
INJECTION INTRAMUSCULAR; INTRAVENOUS PRN
Status: DISCONTINUED | OUTPATIENT
Start: 2024-05-26 | End: 2024-05-26 | Stop reason: SDUPTHER

## 2024-05-26 RX ORDER — ONDANSETRON 2 MG/ML
INJECTION INTRAMUSCULAR; INTRAVENOUS PRN
Status: DISCONTINUED | OUTPATIENT
Start: 2024-05-26 | End: 2024-05-26 | Stop reason: SDUPTHER

## 2024-05-26 RX ORDER — SODIUM CHLORIDE 0.9 % (FLUSH) 0.9 %
5-40 SYRINGE (ML) INJECTION EVERY 12 HOURS SCHEDULED
Status: DISCONTINUED | OUTPATIENT
Start: 2024-05-26 | End: 2024-05-26 | Stop reason: HOSPADM

## 2024-05-26 RX ORDER — NALOXONE HYDROCHLORIDE 0.4 MG/ML
INJECTION, SOLUTION INTRAMUSCULAR; INTRAVENOUS; SUBCUTANEOUS PRN
Status: DISCONTINUED | OUTPATIENT
Start: 2024-05-26 | End: 2024-05-26 | Stop reason: HOSPADM

## 2024-05-26 RX ORDER — ACETAMINOPHEN 325 MG/1
650 TABLET ORAL EVERY 6 HOURS
Status: DISCONTINUED | OUTPATIENT
Start: 2024-05-26 | End: 2024-05-29 | Stop reason: HOSPADM

## 2024-05-26 RX ORDER — OXYCODONE HYDROCHLORIDE 5 MG/1
5 TABLET ORAL
Status: DISCONTINUED | OUTPATIENT
Start: 2024-05-26 | End: 2024-05-26 | Stop reason: HOSPADM

## 2024-05-26 RX ORDER — SODIUM CHLORIDE 0.9 % (FLUSH) 0.9 %
5-40 SYRINGE (ML) INJECTION PRN
Status: DISCONTINUED | OUTPATIENT
Start: 2024-05-26 | End: 2024-05-29 | Stop reason: HOSPADM

## 2024-05-26 RX ORDER — SODIUM CHLORIDE 9 MG/ML
INJECTION, SOLUTION INTRAVENOUS PRN
Status: DISCONTINUED | OUTPATIENT
Start: 2024-05-26 | End: 2024-05-29 | Stop reason: HOSPADM

## 2024-05-26 RX ORDER — FENTANYL CITRATE 50 UG/ML
25 INJECTION, SOLUTION INTRAMUSCULAR; INTRAVENOUS EVERY 5 MIN PRN
Status: DISCONTINUED | OUTPATIENT
Start: 2024-05-26 | End: 2024-05-26 | Stop reason: HOSPADM

## 2024-05-26 RX ORDER — GLYCOPYRROLATE 0.2 MG/ML
INJECTION INTRAMUSCULAR; INTRAVENOUS PRN
Status: DISCONTINUED | OUTPATIENT
Start: 2024-05-26 | End: 2024-05-26 | Stop reason: SDUPTHER

## 2024-05-26 RX ORDER — BISACODYL 5 MG/1
5 TABLET, DELAYED RELEASE ORAL ONCE
Status: COMPLETED | OUTPATIENT
Start: 2024-05-26 | End: 2024-05-26

## 2024-05-26 RX ORDER — EPHEDRINE SULFATE/0.9% NACL/PF 50 MG/5 ML
SYRINGE (ML) INTRAVENOUS PRN
Status: DISCONTINUED | OUTPATIENT
Start: 2024-05-26 | End: 2024-05-26 | Stop reason: SDUPTHER

## 2024-05-26 RX ORDER — FENTANYL CITRATE 50 UG/ML
INJECTION, SOLUTION INTRAMUSCULAR; INTRAVENOUS PRN
Status: DISCONTINUED | OUTPATIENT
Start: 2024-05-26 | End: 2024-05-26 | Stop reason: SDUPTHER

## 2024-05-26 RX ORDER — ONDANSETRON 2 MG/ML
4 INJECTION INTRAMUSCULAR; INTRAVENOUS
Status: DISCONTINUED | OUTPATIENT
Start: 2024-05-26 | End: 2024-05-26 | Stop reason: HOSPADM

## 2024-05-26 RX ORDER — PROPOFOL 10 MG/ML
INJECTION, EMULSION INTRAVENOUS PRN
Status: DISCONTINUED | OUTPATIENT
Start: 2024-05-26 | End: 2024-05-26 | Stop reason: SDUPTHER

## 2024-05-26 RX ORDER — PHENYLEPHRINE HCL IN 0.9% NACL 1 MG/10 ML
SYRINGE (ML) INTRAVENOUS PRN
Status: DISCONTINUED | OUTPATIENT
Start: 2024-05-26 | End: 2024-05-26 | Stop reason: SDUPTHER

## 2024-05-26 RX ORDER — HYDROMORPHONE HYDROCHLORIDE 1 MG/ML
0.5 INJECTION, SOLUTION INTRAMUSCULAR; INTRAVENOUS; SUBCUTANEOUS EVERY 5 MIN PRN
Status: DISCONTINUED | OUTPATIENT
Start: 2024-05-26 | End: 2024-05-26 | Stop reason: HOSPADM

## 2024-05-26 RX ORDER — MAGNESIUM HYDROXIDE 1200 MG/15ML
LIQUID ORAL CONTINUOUS PRN
Status: COMPLETED | OUTPATIENT
Start: 2024-05-26 | End: 2024-05-26

## 2024-05-26 RX ORDER — SODIUM CHLORIDE 9 MG/ML
INJECTION, SOLUTION INTRAVENOUS PRN
Status: DISCONTINUED | OUTPATIENT
Start: 2024-05-26 | End: 2024-05-26 | Stop reason: HOSPADM

## 2024-05-26 RX ORDER — DEXAMETHASONE SODIUM PHOSPHATE 4 MG/ML
INJECTION, SOLUTION INTRA-ARTICULAR; INTRALESIONAL; INTRAMUSCULAR; INTRAVENOUS; SOFT TISSUE PRN
Status: DISCONTINUED | OUTPATIENT
Start: 2024-05-26 | End: 2024-05-26 | Stop reason: SDUPTHER

## 2024-05-26 RX ORDER — SODIUM CHLORIDE 0.9 % (FLUSH) 0.9 %
5-40 SYRINGE (ML) INJECTION EVERY 12 HOURS SCHEDULED
Status: DISCONTINUED | OUTPATIENT
Start: 2024-05-26 | End: 2024-05-29 | Stop reason: HOSPADM

## 2024-05-26 RX ORDER — LIDOCAINE HYDROCHLORIDE 20 MG/ML
INJECTION, SOLUTION EPIDURAL; INFILTRATION; INTRACAUDAL; PERINEURAL PRN
Status: DISCONTINUED | OUTPATIENT
Start: 2024-05-26 | End: 2024-05-26 | Stop reason: SDUPTHER

## 2024-05-26 RX ORDER — LABETALOL HYDROCHLORIDE 5 MG/ML
10 INJECTION, SOLUTION INTRAVENOUS
Status: DISCONTINUED | OUTPATIENT
Start: 2024-05-26 | End: 2024-05-26 | Stop reason: HOSPADM

## 2024-05-26 RX ORDER — SODIUM CHLORIDE 0.9 % (FLUSH) 0.9 %
5-40 SYRINGE (ML) INJECTION PRN
Status: DISCONTINUED | OUTPATIENT
Start: 2024-05-26 | End: 2024-05-26 | Stop reason: HOSPADM

## 2024-05-26 RX ORDER — POLYETHYLENE GLYCOL 3350 17 G/17G
17 POWDER, FOR SOLUTION ORAL DAILY
Status: DISCONTINUED | OUTPATIENT
Start: 2024-05-26 | End: 2024-05-29 | Stop reason: HOSPADM

## 2024-05-26 RX ORDER — SUCCINYLCHOLINE/SOD CL,ISO/PF 100 MG/5ML
SYRINGE (ML) INTRAVENOUS PRN
Status: DISCONTINUED | OUTPATIENT
Start: 2024-05-26 | End: 2024-05-26 | Stop reason: SDUPTHER

## 2024-05-26 RX ADMIN — DEXAMETHASONE SODIUM PHOSPHATE 4 MG: 4 INJECTION, SOLUTION INTRAMUSCULAR; INTRAVENOUS at 08:20

## 2024-05-26 RX ADMIN — Medication 100 MG: at 08:15

## 2024-05-26 RX ADMIN — SODIUM CHLORIDE 10 ML/HR: 900 INJECTION, SOLUTION INTRAVENOUS at 09:36

## 2024-05-26 RX ADMIN — POLYETHYLENE GLYCOL 3350 17 G: 17 POWDER, FOR SOLUTION ORAL at 15:06

## 2024-05-26 RX ADMIN — Medication 200 MCG: at 08:20

## 2024-05-26 RX ADMIN — HEPARIN SODIUM 5000 UNITS: 5000 INJECTION INTRAVENOUS; SUBCUTANEOUS at 15:06

## 2024-05-26 RX ADMIN — SODIUM CHLORIDE, PRESERVATIVE FREE 10 ML: 5 INJECTION INTRAVENOUS at 13:14

## 2024-05-26 RX ADMIN — BISACODYL 5 MG: 5 TABLET, COATED ORAL at 15:11

## 2024-05-26 RX ADMIN — SODIUM CHLORIDE, PRESERVATIVE FREE 10 ML: 5 INJECTION INTRAVENOUS at 20:24

## 2024-05-26 RX ADMIN — METOCLOPRAMIDE 10 MG: 5 INJECTION, SOLUTION INTRAMUSCULAR; INTRAVENOUS at 08:10

## 2024-05-26 RX ADMIN — INSULIN LISPRO 4 UNITS: 100 INJECTION, SOLUTION INTRAVENOUS; SUBCUTANEOUS at 18:13

## 2024-05-26 RX ADMIN — ONDANSETRON HYDROCHLORIDE 4 MG: 2 INJECTION INTRAMUSCULAR; INTRAVENOUS at 08:50

## 2024-05-26 RX ADMIN — PROPOFOL 80 MG: 10 INJECTION, EMULSION INTRAVENOUS at 08:15

## 2024-05-26 RX ADMIN — SODIUM CHLORIDE, SODIUM LACTATE, CALCIUM CHLORIDE, MAGNESIUM CHLORIDE AND DEXTROSE 2000 ML: 1.5; 538; 448; 18.3; 5.08 INJECTION, SOLUTION INTRAPERITONEAL at 20:10

## 2024-05-26 RX ADMIN — GLYCOPYRROLATE 0.2 MG: 0.2 INJECTION INTRAMUSCULAR; INTRAVENOUS at 08:10

## 2024-05-26 RX ADMIN — SODIUM CHLORIDE: 900 INJECTION, SOLUTION INTRAVENOUS at 08:10

## 2024-05-26 RX ADMIN — LIDOCAINE HYDROCHLORIDE 100 MG: 20 INJECTION, SOLUTION EPIDURAL; INFILTRATION; INTRACAUDAL; PERINEURAL at 08:15

## 2024-05-26 RX ADMIN — FENTANYL CITRATE 50 MCG: 50 INJECTION, SOLUTION INTRAMUSCULAR; INTRAVENOUS at 08:25

## 2024-05-26 RX ADMIN — DROPERIDOL 0.62 MG: 2.5 INJECTION, SOLUTION INTRAMUSCULAR; INTRAVENOUS at 08:50

## 2024-05-26 RX ADMIN — OXYCODONE AND ACETAMINOPHEN 1 TABLET: 10; 325 TABLET ORAL at 04:56

## 2024-05-26 RX ADMIN — Medication 10 MG: at 08:31

## 2024-05-26 RX ADMIN — Medication 200 MCG: at 08:31

## 2024-05-26 RX ADMIN — WATER 2000 MG: 1 INJECTION INTRAMUSCULAR; INTRAVENOUS; SUBCUTANEOUS at 08:20

## 2024-05-26 RX ADMIN — Medication 200 MCG: at 08:22

## 2024-05-26 RX ADMIN — ACETAMINOPHEN 650 MG: 325 TABLET ORAL at 20:22

## 2024-05-26 RX ADMIN — FENTANYL CITRATE 50 MCG: 50 INJECTION, SOLUTION INTRAMUSCULAR; INTRAVENOUS at 08:10

## 2024-05-26 RX ADMIN — SODIUM CHLORIDE, SODIUM LACTATE, CALCIUM CHLORIDE, MAGNESIUM CHLORIDE AND DEXTROSE 2000 ML: 1.5; 538; 448; 18.3; 5.08 INJECTION, SOLUTION INTRAPERITONEAL at 13:04

## 2024-05-26 RX ADMIN — MIDAZOLAM 2 MG: 1 INJECTION INTRAMUSCULAR; INTRAVENOUS at 08:10

## 2024-05-26 RX ADMIN — ONDANSETRON 4 MG: 2 INJECTION INTRAMUSCULAR; INTRAVENOUS at 07:19

## 2024-05-26 RX ADMIN — Medication 10 MG: at 08:22

## 2024-05-26 RX ADMIN — SODIUM CHLORIDE, PRESERVATIVE FREE 10 ML: 5 INJECTION INTRAVENOUS at 07:30

## 2024-05-26 RX ADMIN — Medication 100 MCG: at 08:43

## 2024-05-26 RX ADMIN — HYDRALAZINE HYDROCHLORIDE 10 MG: 20 INJECTION INTRAMUSCULAR; INTRAVENOUS at 07:18

## 2024-05-26 ASSESSMENT — PAIN DESCRIPTION - LOCATION
LOCATION: KNEE;LEG
LOCATION: HIP
LOCATION: HIP

## 2024-05-26 ASSESSMENT — PAIN - FUNCTIONAL ASSESSMENT
PAIN_FUNCTIONAL_ASSESSMENT: ADULT NONVERBAL PAIN SCALE (NPVS)
PAIN_FUNCTIONAL_ASSESSMENT: PREVENTS OR INTERFERES SOME ACTIVE ACTIVITIES AND ADLS
PAIN_FUNCTIONAL_ASSESSMENT: NONE - DENIES PAIN
PAIN_FUNCTIONAL_ASSESSMENT: 0-10
PAIN_FUNCTIONAL_ASSESSMENT: NONE - DENIES PAIN
PAIN_FUNCTIONAL_ASSESSMENT: NONE - DENIES PAIN

## 2024-05-26 ASSESSMENT — PAIN SCALES - GENERAL
PAINLEVEL_OUTOF10: 0
PAINLEVEL_OUTOF10: 4
PAINLEVEL_OUTOF10: 0
PAINLEVEL_OUTOF10: 4
PAINLEVEL_OUTOF10: 4
PAINLEVEL_OUTOF10: 0
PAINLEVEL_OUTOF10: 2
PAINLEVEL_OUTOF10: 1
PAINLEVEL_OUTOF10: 4

## 2024-05-26 ASSESSMENT — PAIN DESCRIPTION - ORIENTATION
ORIENTATION: RIGHT

## 2024-05-26 ASSESSMENT — PAIN DESCRIPTION - DESCRIPTORS
DESCRIPTORS: ACHING

## 2024-05-26 ASSESSMENT — PAIN SCALES - WONG BAKER
WONGBAKER_NUMERICALRESPONSE: HURTS A LITTLE BIT
WONGBAKER_NUMERICALRESPONSE: NO HURT
WONGBAKER_NUMERICALRESPONSE: NO HURT

## 2024-05-26 ASSESSMENT — PAIN DESCRIPTION - FREQUENCY: FREQUENCY: INTERMITTENT

## 2024-05-26 NOTE — INTERVAL H&P NOTE
Update History & Physical    The patient's History and Physical was reviewed with the patient and I examined the patient. There was no change. The surgical site was confirmed by the patient and me.     Plan: The risks, benefits, expected outcome, and alternative to the recommended procedure have been discussed with the patient. Patient understands and wants to proceed with the procedure.     Electronically signed by Albino Rebolledo DO on 5/26/2024 at 7:57 AM

## 2024-05-26 NOTE — FLOWSHEET NOTE
05/26/24 0730   Vital Signs   Pulse 67   Heart Rate Source Monitor   Respirations 20   BP (!) 151/57   MAP (Calculated) 88   BP Location Right upper arm   BP Method Automatic   Patient Position Semi fowlers     Report received from Max in pre op. Informed him that we addressed her BP an would recheck soon. Pt is now headed to pre op.

## 2024-05-26 NOTE — FLOWSHEET NOTE
05/26/24 1000   Vital Signs   Temp 97.1 °F (36.2 °C)   Temp Source Oral   Pulse 81   Heart Rate Source Monitor   Respirations 15   BP (!) 119/52   MAP (Calculated) 74   BP Location Right upper arm   BP Method Automatic   Patient Position Semi fowlers     Received pt from PACU. Pt is very drowsy but easy to arouse. No s/s of any pain. Pt dressing to hip is clean dry an intact. Vital WNL. Family is at the bedside. No complaints at this time will continue to monitor.

## 2024-05-26 NOTE — BRIEF OP NOTE
Brief Postoperative Note      Patient: Carmen Ledesma  YOB: 1955  MRN: [id number]    Date of Procedure: 5/26/2024    Pre-Op Diagnosis Codes:     * Closed fracture of neck of right femur, initial encounter ([facility]) [S72.001A]    Post-Op Diagnosis: Same       Procedure(s):  Right Hip Closed Reduction Fixation    Surgeon(s):  Albino Rebolledo DO    Assistant:  Surgical Assistant: Ness Kilpatrick  Physician Assistant: Stephanie Gonzalez PA-C    Anesthesia: General    Estimated Blood Loss (mL): less than 50     Complications: None    Specimens:   * No specimens in log *    Implants:  Implant Name Type Inv. Item Serial No.  Lot No. LRB No. Used Action   7.3 Cannulated Screw 80MM    [employer]- Right 2 Implanted   7.3 Cannulated Screw 70MM    [employer]- Right 1 Implanted         Drains:   NG/OG/NJ/NE Tube Orogastric 18 fr Center mouth (Active)       Findings:  Infection Present At Time Of Surgery (PATOS) (choose all levels that have infection present):  No infection present  Other Findings: minimally displaced impacted femoral neck fracture    Electronically signed by Albino Rebolledo DO on 5/26/2024 at 9:05 AM

## 2024-05-26 NOTE — FLOWSHEET NOTE
Transferred Pt to 340  RN at bedside. Dressing CDI. Left pt stable.   BP (!) (P) 117/51   Pulse (P) 80   Temp (P) 97.3 °F (36.3 °C) (Oral)   Resp (P) 15   Ht 1.626 m (5' 4\")   Wt 63.9 kg (140 lb 14 oz)   SpO2 (P) 100%   BMI 24.18 kg/m²

## 2024-05-26 NOTE — OP NOTE
[address]  [address]                            OPERATIVE REPORT      PATIENT NAME: TABATHA COBB            : 1955  MED REC NO: [id number]                       ROOM: 340  ACCOUNT NO: [id number]                       ADMIT DATE: 2024  PROVIDER: Albino Rebolledo DO    DATE OF SERVICE:  2024    PREOPERATIVE DIAGNOSES:  Closed minimally displaced impacted femoral neck fracture, right.    POSTOPERATIVE DIAGNOSES:  Closed minimally displaced impacted femoral neck fracture, right.    PROCEDURES PERFORMED:  Percutaneous fixation with cannulated screws, femoral neck fracture, right.    SURGEON:  Albino Rebolledo DO    ASSISTANT:  Stephanie Gonzalez PA-C    ANESTHESIA:  General.    ESTIMATED BLOOD LOSS:  50 mL.    SPECIMENS REMOVED:  na    INTRAOPERATIVE FINDINGS:  Impacted femoral neck fracture, stable in position after a fall.     COMPLICATIONS:  None.    IMPLANTS:  7.3 cannulated screws, two 80s and one 70.    INDICATIONS:  A 68-year-old presented to the ER with difficulty ambulation of weeks' duration after a fall, fatigue. Complains of pain about the anterior part of the groin.   On evaluation, x-rays including CT scan noted minimally displaced impacted femoral neck fracture on the right.  At that point, we continued plans for admit and surgical stabilization with cannulated screw fixation.  I did evaluation. After clearance was gained by Nephrology and Medical team, we continued plans for surgery for stabilization and evaluation by Anesthesia.  Consent was obtained, reviewed with the patient's family as well.  We will continue with planned procedure.    DESCRIPTION OF PROCEDURE:  Taken to surgery suite, underwent general anesthesia, placed onto a fracture table with left in well leg lizarraga and right on extension. Evaluation and fluoroscopic images noted stability of the fracture.  We continued plans for cannulated

## 2024-05-26 NOTE — FLOWSHEET NOTE
05/26/24 0700   Vital Signs   Temp 98 °F (36.7 °C)   Temp Source Oral   Pulse 64   Heart Rate Source Monitor   Respirations 18   BP (!) 188/53   MAP (Calculated) 98   BP Location Right upper arm   BP Method Automatic   Patient Position Semi fowlers     Pt stated he was not feeling well.She stated that she feels faint.     Complaining of nausea administered PRN   BP is e;elevated  administered PRN   Will continue to monitor.

## 2024-05-26 NOTE — PERIOP NOTE
Notified Dr. Zaman of FBS result of 215, ordered to no treat at this time, inform 3N to continue to manage and monitor per orders.

## 2024-05-26 NOTE — PERIOP NOTE
TRANSFER - IN REPORT:    Verbal report received from OR  on Carmen Ledesma  being received from OR for routine post-op      Report consisted of patient's Situation, Background, Assessment and   Recommendations(SBAR).     Information from the following report(s) Nurse Handoff Report, Adult Overview, Surgery Report, Intake/Output, and MAR was reviewed with the receiving nurse.    Opportunity for questions and clarification was provided.      Assessment completed upon patient's arrival to unit and care assumed.

## 2024-05-27 LAB
ANION GAP SERPL CALC-SCNC: 10 MMOL/L (ref 3–18)
BASOPHILS # BLD: 0 K/UL (ref 0–0.1)
BASOPHILS NFR BLD: 0 % (ref 0–2)
BUN SERPL-MCNC: 40 MG/DL (ref 7–18)
BUN/CREAT SERPL: 7 (ref 12–20)
CALCIUM SERPL-MCNC: 8.4 MG/DL (ref 8.5–10.1)
CHLORIDE SERPL-SCNC: 99 MMOL/L (ref 100–111)
CO2 SERPL-SCNC: 26 MMOL/L (ref 21–32)
CREAT SERPL-MCNC: 5.58 MG/DL (ref 0.6–1.3)
DIFFERENTIAL METHOD BLD: ABNORMAL
EOSINOPHIL # BLD: 0 K/UL (ref 0–0.4)
EOSINOPHIL NFR BLD: 0 % (ref 0–5)
ERYTHROCYTE [DISTWIDTH] IN BLOOD BY AUTOMATED COUNT: 13 % (ref 11.6–14.5)
GLUCOSE BLD STRIP.AUTO-MCNC: 197 MG/DL (ref 70–110)
GLUCOSE BLD STRIP.AUTO-MCNC: 240 MG/DL (ref 70–110)
GLUCOSE BLD STRIP.AUTO-MCNC: 246 MG/DL (ref 70–110)
GLUCOSE BLD STRIP.AUTO-MCNC: 280 MG/DL (ref 70–110)
GLUCOSE SERPL-MCNC: 192 MG/DL (ref 74–99)
HCT VFR BLD AUTO: 27.3 % (ref 35–45)
HGB BLD-MCNC: 8.7 G/DL (ref 12–16)
IMM GRANULOCYTES # BLD AUTO: 0 K/UL (ref 0–0.04)
IMM GRANULOCYTES NFR BLD AUTO: 0 % (ref 0–0.5)
LYMPHOCYTES # BLD: 2.9 K/UL (ref 0.9–3.6)
LYMPHOCYTES NFR BLD: 30 % (ref 21–52)
MAGNESIUM SERPL-MCNC: 1.7 MG/DL (ref 1.6–2.6)
MCH RBC QN AUTO: 31 PG (ref 24–34)
MCHC RBC AUTO-ENTMCNC: 31.9 G/DL (ref 31–37)
MCV RBC AUTO: 97.2 FL (ref 78–100)
MONOCYTES # BLD: 1.2 K/UL (ref 0.05–1.2)
MONOCYTES NFR BLD: 12 % (ref 3–10)
NEUTS SEG # BLD: 5.5 K/UL (ref 1.8–8)
NEUTS SEG NFR BLD: 57 % (ref 40–73)
NRBC # BLD: 0 K/UL (ref 0–0.01)
NRBC BLD-RTO: 0 PER 100 WBC
PLATELET # BLD AUTO: 359 K/UL (ref 135–420)
PMV BLD AUTO: 10.3 FL (ref 9.2–11.8)
POTASSIUM SERPL-SCNC: 3.7 MMOL/L (ref 3.5–5.5)
RBC # BLD AUTO: 2.81 M/UL (ref 4.2–5.3)
WBC # BLD AUTO: 9.8 K/UL (ref 4.6–13.2)

## 2024-05-27 PROCEDURE — 6370000000 HC RX 637 (ALT 250 FOR IP): Performed by: PHYSICIAN ASSISTANT

## 2024-05-27 PROCEDURE — 2580000003 HC RX 258: Performed by: HOSPITALIST

## 2024-05-27 PROCEDURE — 83735 ASSAY OF MAGNESIUM: CPT

## 2024-05-27 PROCEDURE — 99232 SBSQ HOSP IP/OBS MODERATE 35: CPT | Performed by: INTERNAL MEDICINE

## 2024-05-27 PROCEDURE — 97530 THERAPEUTIC ACTIVITIES: CPT

## 2024-05-27 PROCEDURE — 97161 PT EVAL LOW COMPLEX 20 MIN: CPT

## 2024-05-27 PROCEDURE — 36415 COLL VENOUS BLD VENIPUNCTURE: CPT

## 2024-05-27 PROCEDURE — 2580000003 HC RX 258: Performed by: PHYSICIAN ASSISTANT

## 2024-05-27 PROCEDURE — 97165 OT EVAL LOW COMPLEX 30 MIN: CPT

## 2024-05-27 PROCEDURE — 80048 BASIC METABOLIC PNL TOTAL CA: CPT

## 2024-05-27 PROCEDURE — 6360000002 HC RX W HCPCS: Performed by: HOSPITALIST

## 2024-05-27 PROCEDURE — 6360000002 HC RX W HCPCS: Performed by: STUDENT IN AN ORGANIZED HEALTH CARE EDUCATION/TRAINING PROGRAM

## 2024-05-27 PROCEDURE — A4722 DIALYS SOL FLD VOL > 1999CC: HCPCS | Performed by: STUDENT IN AN ORGANIZED HEALTH CARE EDUCATION/TRAINING PROGRAM

## 2024-05-27 PROCEDURE — 6370000000 HC RX 637 (ALT 250 FOR IP): Performed by: HOSPITALIST

## 2024-05-27 PROCEDURE — 82962 GLUCOSE BLOOD TEST: CPT

## 2024-05-27 PROCEDURE — 6370000000 HC RX 637 (ALT 250 FOR IP): Performed by: INTERNAL MEDICINE

## 2024-05-27 PROCEDURE — 1100000003 HC PRIVATE W/ TELEMETRY

## 2024-05-27 PROCEDURE — 85025 COMPLETE CBC W/AUTO DIFF WBC: CPT

## 2024-05-27 PROCEDURE — 6360000002 HC RX W HCPCS: Performed by: PHYSICIAN ASSISTANT

## 2024-05-27 RX ADMIN — SODIUM CHLORIDE, SODIUM LACTATE, CALCIUM CHLORIDE, MAGNESIUM CHLORIDE AND DEXTROSE 2000 ML: 1.5; 538; 448; 18.3; 5.08 INJECTION, SOLUTION INTRAPERITONEAL at 16:06

## 2024-05-27 RX ADMIN — ACETAMINOPHEN 650 MG: 325 TABLET ORAL at 02:10

## 2024-05-27 RX ADMIN — INSULIN LISPRO 2 UNITS: 100 INJECTION, SOLUTION INTRAVENOUS; SUBCUTANEOUS at 18:13

## 2024-05-27 RX ADMIN — ONDANSETRON 4 MG: 4 TABLET, ORALLY DISINTEGRATING ORAL at 13:18

## 2024-05-27 RX ADMIN — ACETAMINOPHEN 650 MG: 325 TABLET ORAL at 20:34

## 2024-05-27 RX ADMIN — SODIUM CHLORIDE, PRESERVATIVE FREE 10 ML: 5 INJECTION INTRAVENOUS at 20:37

## 2024-05-27 RX ADMIN — SODIUM CHLORIDE, SODIUM LACTATE, CALCIUM CHLORIDE, MAGNESIUM CHLORIDE AND DEXTROSE 2000 ML: 1.5; 538; 448; 18.3; 5.08 INJECTION, SOLUTION INTRAPERITONEAL at 07:12

## 2024-05-27 RX ADMIN — INSULIN LISPRO 4 UNITS: 100 INJECTION, SOLUTION INTRAVENOUS; SUBCUTANEOUS at 13:11

## 2024-05-27 RX ADMIN — CALCITRIOL CAPSULES 0.25 MCG 0.25 MCG: 0.25 CAPSULE ORAL at 09:04

## 2024-05-27 RX ADMIN — SODIUM CHLORIDE, SODIUM LACTATE, CALCIUM CHLORIDE, MAGNESIUM CHLORIDE AND DEXTROSE 2000 ML: 1.5; 538; 448; 18.3; 5.08 INJECTION, SOLUTION INTRAPERITONEAL at 00:52

## 2024-05-27 RX ADMIN — HEPARIN SODIUM 5000 UNITS: 5000 INJECTION INTRAVENOUS; SUBCUTANEOUS at 13:12

## 2024-05-27 RX ADMIN — SODIUM CHLORIDE, PRESERVATIVE FREE 10 ML: 5 INJECTION INTRAVENOUS at 20:36

## 2024-05-27 RX ADMIN — INSULIN GLARGINE 5 UNITS: 100 INJECTION, SOLUTION SUBCUTANEOUS at 09:04

## 2024-05-27 RX ADMIN — ACETAMINOPHEN 650 MG: 325 TABLET ORAL at 09:03

## 2024-05-27 RX ADMIN — POLYETHYLENE GLYCOL 3350 17 G: 17 POWDER, FOR SOLUTION ORAL at 09:03

## 2024-05-27 RX ADMIN — SODIUM CHLORIDE, SODIUM LACTATE, CALCIUM CHLORIDE, MAGNESIUM CHLORIDE AND DEXTROSE 2000 ML: 1.5; 538; 448; 18.3; 5.08 INJECTION, SOLUTION INTRAPERITONEAL at 20:45

## 2024-05-27 RX ADMIN — SODIUM CHLORIDE, SODIUM LACTATE, CALCIUM CHLORIDE, MAGNESIUM CHLORIDE AND DEXTROSE 2000 ML: 1.5; 538; 448; 18.3; 5.08 INJECTION, SOLUTION INTRAPERITONEAL at 11:42

## 2024-05-27 RX ADMIN — WATER 2000 MG: 1 INJECTION INTRAMUSCULAR; INTRAVENOUS; SUBCUTANEOUS at 05:50

## 2024-05-27 RX ADMIN — OXYCODONE AND ACETAMINOPHEN 1 TABLET: 10; 325 TABLET ORAL at 05:49

## 2024-05-27 RX ADMIN — SODIUM CHLORIDE, PRESERVATIVE FREE 10 ML: 5 INJECTION INTRAVENOUS at 09:04

## 2024-05-27 RX ADMIN — ATENOLOL 25 MG: 50 TABLET ORAL at 09:03

## 2024-05-27 ASSESSMENT — PAIN DESCRIPTION - ORIENTATION
ORIENTATION: RIGHT

## 2024-05-27 ASSESSMENT — PAIN DESCRIPTION - LOCATION
LOCATION: HIP

## 2024-05-27 ASSESSMENT — PAIN DESCRIPTION - DESCRIPTORS
DESCRIPTORS: ACHING

## 2024-05-27 ASSESSMENT — PAIN SCALES - GENERAL
PAINLEVEL_OUTOF10: 4
PAINLEVEL_OUTOF10: 0
PAINLEVEL_OUTOF10: 4
PAINLEVEL_OUTOF10: 2

## 2024-05-28 LAB
GLUCOSE BLD STRIP.AUTO-MCNC: 181 MG/DL (ref 70–110)
GLUCOSE BLD STRIP.AUTO-MCNC: 208 MG/DL (ref 70–110)
GLUCOSE BLD STRIP.AUTO-MCNC: 209 MG/DL (ref 70–110)
GLUCOSE BLD STRIP.AUTO-MCNC: 257 MG/DL (ref 70–110)

## 2024-05-28 PROCEDURE — 1100000003 HC PRIVATE W/ TELEMETRY

## 2024-05-28 PROCEDURE — 2580000003 HC RX 258: Performed by: PHYSICIAN ASSISTANT

## 2024-05-28 PROCEDURE — 6360000002 HC RX W HCPCS: Performed by: HOSPITALIST

## 2024-05-28 PROCEDURE — 6370000000 HC RX 637 (ALT 250 FOR IP): Performed by: INTERNAL MEDICINE

## 2024-05-28 PROCEDURE — 6360000002 HC RX W HCPCS: Performed by: PHYSICIAN ASSISTANT

## 2024-05-28 PROCEDURE — A4722 DIALYS SOL FLD VOL > 1999CC: HCPCS | Performed by: STUDENT IN AN ORGANIZED HEALTH CARE EDUCATION/TRAINING PROGRAM

## 2024-05-28 PROCEDURE — 2580000003 HC RX 258: Performed by: HOSPITALIST

## 2024-05-28 PROCEDURE — 0QS634Z REPOSITION RIGHT UPPER FEMUR WITH INTERNAL FIXATION DEVICE, PERCUTANEOUS APPROACH: ICD-10-PCS | Performed by: ORTHOPAEDIC SURGERY

## 2024-05-28 PROCEDURE — 97110 THERAPEUTIC EXERCISES: CPT

## 2024-05-28 PROCEDURE — 6360000002 HC RX W HCPCS: Performed by: INTERNAL MEDICINE

## 2024-05-28 PROCEDURE — 6360000002 HC RX W HCPCS: Performed by: STUDENT IN AN ORGANIZED HEALTH CARE EDUCATION/TRAINING PROGRAM

## 2024-05-28 PROCEDURE — 6370000000 HC RX 637 (ALT 250 FOR IP): Performed by: HOSPITALIST

## 2024-05-28 PROCEDURE — 82962 GLUCOSE BLOOD TEST: CPT

## 2024-05-28 PROCEDURE — 6370000000 HC RX 637 (ALT 250 FOR IP): Performed by: PHYSICIAN ASSISTANT

## 2024-05-28 RX ORDER — SODIUM CHLORIDE, SODIUM LACTATE, CALCIUM CHLORIDE, MAGNESIUM CHLORIDE AND DEXTROSE 1.5; 538; 448; 18.3; 5.08 G/100ML; MG/100ML; MG/100ML; MG/100ML; MG/100ML
2000 INJECTION, SOLUTION INTRAPERITONEAL 4 TIMES DAILY
Status: DISCONTINUED | OUTPATIENT
Start: 2024-05-28 | End: 2024-05-29 | Stop reason: HOSPADM

## 2024-05-28 RX ADMIN — INSULIN LISPRO 4 UNITS: 100 INJECTION, SOLUTION INTRAVENOUS; SUBCUTANEOUS at 12:00

## 2024-05-28 RX ADMIN — SODIUM CHLORIDE, SODIUM LACTATE, CALCIUM CHLORIDE, MAGNESIUM CHLORIDE AND DEXTROSE 2000 ML: 1.5; 538; 448; 18.3; 5.08 INJECTION, SOLUTION INTRAPERITONEAL at 20:27

## 2024-05-28 RX ADMIN — ATENOLOL 25 MG: 50 TABLET ORAL at 07:55

## 2024-05-28 RX ADMIN — HEPARIN SODIUM 5000 UNITS: 5000 INJECTION INTRAVENOUS; SUBCUTANEOUS at 21:37

## 2024-05-28 RX ADMIN — SODIUM CHLORIDE, SODIUM LACTATE, CALCIUM CHLORIDE, MAGNESIUM CHLORIDE AND DEXTROSE 2000 ML: 1.5; 538; 448; 18.3; 5.08 INJECTION, SOLUTION INTRAPERITONEAL at 07:02

## 2024-05-28 RX ADMIN — POLYETHYLENE GLYCOL 3350 17 G: 17 POWDER, FOR SOLUTION ORAL at 07:54

## 2024-05-28 RX ADMIN — WATER 2000 MG: 1 INJECTION INTRAMUSCULAR; INTRAVENOUS; SUBCUTANEOUS at 06:19

## 2024-05-28 RX ADMIN — ACETAMINOPHEN 650 MG: 325 TABLET ORAL at 22:14

## 2024-05-28 RX ADMIN — HEPARIN SODIUM 5000 UNITS: 5000 INJECTION INTRAVENOUS; SUBCUTANEOUS at 13:42

## 2024-05-28 RX ADMIN — SODIUM CHLORIDE, SODIUM LACTATE, CALCIUM CHLORIDE, MAGNESIUM CHLORIDE AND DEXTROSE 2000 ML: 1.5; 538; 448; 18.3; 5.08 INJECTION, SOLUTION INTRAPERITONEAL at 12:00

## 2024-05-28 RX ADMIN — SODIUM CHLORIDE, PRESERVATIVE FREE 10 ML: 5 INJECTION INTRAVENOUS at 07:55

## 2024-05-28 RX ADMIN — SODIUM CHLORIDE, SODIUM LACTATE, CALCIUM CHLORIDE, MAGNESIUM CHLORIDE AND DEXTROSE 2000 ML: 1.5; 538; 448; 18.3; 5.08 INJECTION, SOLUTION INTRAPERITONEAL at 17:19

## 2024-05-28 RX ADMIN — CALCITRIOL CAPSULES 0.25 MCG 0.25 MCG: 0.25 CAPSULE ORAL at 07:55

## 2024-05-28 RX ADMIN — INSULIN LISPRO 2 UNITS: 100 INJECTION, SOLUTION INTRAVENOUS; SUBCUTANEOUS at 17:19

## 2024-05-28 RX ADMIN — EPOETIN ALFA-EPBX 6000 UNITS: 3000 INJECTION, SOLUTION INTRAVENOUS; SUBCUTANEOUS at 17:29

## 2024-05-28 RX ADMIN — SODIUM CHLORIDE, PRESERVATIVE FREE 10 ML: 5 INJECTION INTRAVENOUS at 07:57

## 2024-05-28 RX ADMIN — ACETAMINOPHEN 650 MG: 325 TABLET ORAL at 07:57

## 2024-05-28 RX ADMIN — SODIUM CHLORIDE, SODIUM LACTATE, CALCIUM CHLORIDE, MAGNESIUM CHLORIDE AND DEXTROSE 2000 ML: 1.5; 538; 448; 18.3; 5.08 INJECTION, SOLUTION INTRAPERITONEAL at 00:26

## 2024-05-28 RX ADMIN — INSULIN GLARGINE 5 UNITS: 100 INJECTION, SOLUTION SUBCUTANEOUS at 08:30

## 2024-05-28 ASSESSMENT — PAIN SCALES - GENERAL
PAINLEVEL_OUTOF10: 0

## 2024-05-29 ENCOUNTER — HOSPITAL ENCOUNTER (EMERGENCY)
Facility: HOSPITAL | Age: 69
Discharge: HOME | End: 2024-05-30
Attending: EMERGENCY MEDICINE
Payer: MEDICARE

## 2024-05-29 ENCOUNTER — HOME HEALTH ADMISSION (OUTPATIENT)
Age: 69
End: 2024-05-29
Payer: MEDICARE

## 2024-05-29 VITALS
SYSTOLIC BLOOD PRESSURE: 172 MMHG | RESPIRATION RATE: 18 BRPM | WEIGHT: 153.66 LBS | DIASTOLIC BLOOD PRESSURE: 65 MMHG | OXYGEN SATURATION: 98 % | BODY MASS INDEX: 26.23 KG/M2 | TEMPERATURE: 98.3 F | HEART RATE: 78 BPM | HEIGHT: 64 IN

## 2024-05-29 DIAGNOSIS — R60.0 LOWER LEG EDEMA: Primary | ICD-10-CM

## 2024-05-29 DIAGNOSIS — L76.82 POSTOPERATIVE SURGICAL COMPLICATION INVOLVING SKIN ASSOCIATED WITH NON-DERMATOLOGIC PROCEDURE, UNSPECIFIED COMPLICATION: ICD-10-CM

## 2024-05-29 DIAGNOSIS — N18.6 ESRD (END STAGE RENAL DISEASE) ON DIALYSIS (HCC): ICD-10-CM

## 2024-05-29 DIAGNOSIS — Z99.2 ESRD (END STAGE RENAL DISEASE) ON DIALYSIS (HCC): ICD-10-CM

## 2024-05-29 LAB
GLUCOSE BLD STRIP.AUTO-MCNC: 139 MG/DL (ref 70–110)
GLUCOSE BLD STRIP.AUTO-MCNC: 221 MG/DL (ref 70–110)
GLUCOSE BLD STRIP.AUTO-MCNC: 252 MG/DL (ref 70–110)

## 2024-05-29 PROCEDURE — 6360000002 HC RX W HCPCS: Performed by: STUDENT IN AN ORGANIZED HEALTH CARE EDUCATION/TRAINING PROGRAM

## 2024-05-29 PROCEDURE — 2580000003 HC RX 258: Performed by: HOSPITALIST

## 2024-05-29 PROCEDURE — 6360000002 HC RX W HCPCS: Performed by: HOSPITALIST

## 2024-05-29 PROCEDURE — 6370000000 HC RX 637 (ALT 250 FOR IP): Performed by: INTERNAL MEDICINE

## 2024-05-29 PROCEDURE — A4722 DIALYS SOL FLD VOL > 1999CC: HCPCS | Performed by: STUDENT IN AN ORGANIZED HEALTH CARE EDUCATION/TRAINING PROGRAM

## 2024-05-29 PROCEDURE — 82962 GLUCOSE BLOOD TEST: CPT

## 2024-05-29 PROCEDURE — 97116 GAIT TRAINING THERAPY: CPT

## 2024-05-29 PROCEDURE — 80048 BASIC METABOLIC PNL TOTAL CA: CPT

## 2024-05-29 PROCEDURE — 2580000003 HC RX 258: Performed by: PHYSICIAN ASSISTANT

## 2024-05-29 PROCEDURE — 99284 EMERGENCY DEPT VISIT MOD MDM: CPT

## 2024-05-29 PROCEDURE — 6370000000 HC RX 637 (ALT 250 FOR IP): Performed by: HOSPITALIST

## 2024-05-29 PROCEDURE — 97161 PT EVAL LOW COMPLEX 20 MIN: CPT

## 2024-05-29 PROCEDURE — 97530 THERAPEUTIC ACTIVITIES: CPT

## 2024-05-29 RX ORDER — OXYCODONE HYDROCHLORIDE AND ACETAMINOPHEN 5; 325 MG/1; MG/1
1 TABLET ORAL EVERY 8 HOURS PRN
Qty: 8 TABLET | Refills: 0 | Status: SHIPPED | OUTPATIENT
Start: 2024-05-29 | End: 2024-06-01

## 2024-05-29 RX ORDER — CALCITRIOL 0.25 UG/1
0.25 CAPSULE, LIQUID FILLED ORAL DAILY
Qty: 30 CAPSULE | Refills: 0
Start: 2024-05-30

## 2024-05-29 RX ORDER — ATENOLOL 25 MG/1
25 TABLET ORAL DAILY
Qty: 30 TABLET | Refills: 0
Start: 2024-05-30

## 2024-05-29 RX ADMIN — INSULIN LISPRO 2 UNITS: 100 INJECTION, SOLUTION INTRAVENOUS; SUBCUTANEOUS at 12:19

## 2024-05-29 RX ADMIN — ATENOLOL 25 MG: 50 TABLET ORAL at 08:22

## 2024-05-29 RX ADMIN — SODIUM CHLORIDE, SODIUM LACTATE, CALCIUM CHLORIDE, MAGNESIUM CHLORIDE AND DEXTROSE 2000 ML: 1.5; 538; 448; 18.3; 5.08 INJECTION, SOLUTION INTRAPERITONEAL at 16:54

## 2024-05-29 RX ADMIN — HEPARIN SODIUM 5000 UNITS: 5000 INJECTION INTRAVENOUS; SUBCUTANEOUS at 14:40

## 2024-05-29 RX ADMIN — SODIUM CHLORIDE, PRESERVATIVE FREE 10 ML: 5 INJECTION INTRAVENOUS at 08:22

## 2024-05-29 RX ADMIN — SODIUM CHLORIDE, PRESERVATIVE FREE 10 ML: 5 INJECTION INTRAVENOUS at 08:25

## 2024-05-29 RX ADMIN — HEPARIN SODIUM 5000 UNITS: 5000 INJECTION INTRAVENOUS; SUBCUTANEOUS at 05:39

## 2024-05-29 RX ADMIN — SODIUM CHLORIDE, SODIUM LACTATE, CALCIUM CHLORIDE, MAGNESIUM CHLORIDE AND DEXTROSE 2000 ML: 1.5; 538; 448; 18.3; 5.08 INJECTION, SOLUTION INTRAPERITONEAL at 08:02

## 2024-05-29 RX ADMIN — SODIUM CHLORIDE, SODIUM LACTATE, CALCIUM CHLORIDE, MAGNESIUM CHLORIDE AND DEXTROSE 2000 ML: 1.5; 538; 448; 18.3; 5.08 INJECTION, SOLUTION INTRAPERITONEAL at 12:19

## 2024-05-29 RX ADMIN — INSULIN GLARGINE 5 UNITS: 100 INJECTION, SOLUTION SUBCUTANEOUS at 08:22

## 2024-05-29 RX ADMIN — CALCITRIOL CAPSULES 0.25 MCG 0.25 MCG: 0.25 CAPSULE ORAL at 08:22

## 2024-05-29 NOTE — PROGRESS NOTES
Progress Note      Patient: Carmen Ledesma               Sex: female          DOA: 5/24/2024       YOB: 1955      Age:  68 y.o.        LOS:  LOS: 4 days     Status Post: Procedure(s):  Right Hip Closed Reduction Fixation  Surgery Date: 05/26/2024    Subjective:     Carmen Ledesma is a 68 y.o. female who c/o right hip pain improved some since yesterday. No new ortho changes.    Objective:      Visit Vitals  BP (!) 151/59   Pulse 76   Temp 98.7 °F (37.1 °C) (Temporal)   Resp 18   Ht 1.626 m (5' 4\")   Wt 63.9 kg (140 lb 14 oz)   SpO2 97%   BMI 24.18 kg/m²       Physical Exam:   Dressing:  clean, dry, intact - same as POD # 1  Wiggles Toes/Ankle  Foot sensation intact to light touch  No foot edema/ +1 Posterior Tibial Pulse    Intake and Output:  Current Shift:  No intake/output data recorded.  Last three shifts:  05/26 1901 - 05/28 0700  In: 38681 [I.V.:20]  Out: 28098       Lab/Data Reviewed:  Recent Labs     05/27/24  0530   HGB 8.7*   HCT 27.3*   *   K 3.7   CL 99*   CO2 26   BUN 40*       Medications Reviewed    Assessment/Plan     Principal Problem:    Closed right hip fracture, initial encounter ([facility])  Active Problems:    Essential hypertension    Diabetes mellitus ([facility])    ESRD on peritoneal dialysis ([facility])    Fall  Resolved Problems:    * No resolved hospital problems. *      Discharge Planning home with  vs rehab  Continue DVT Prophylaxis - Heparin  Continue meds per primary  Weightbear 50% right lower extremity  Recommend OOB with PT/OT eval  Follow up outpatient with Dr. Rebolledo in 10-14 days for repeat xray and clinical exam        The patient's plan of care discussed with by Dr. Rebolledo today as well and he is in agreement with above.         
    Progress Note      Patient: Carmen Ledesma               Sex: female          DOA: 5/24/2024       YOB: 1955      Age:  68 y.o.        LOS:  LOS: 5 days     Status Post: Procedure(s):  Right Hip Closed Reduction Fixation  Surgery Date: 05/26/2024    Subjective:     Carmen Ledesma is a 68 y.o. female who c/o right hip pain improving. No new ortho complaints.    Objective:      Visit Vitals  BP (!) 147/65   Pulse 68   Temp 97.7 °F (36.5 °C) (Temporal)   Resp 18   Ht 1.626 m (5' 4\")   Wt 69.7 kg (153 lb 10.6 oz)   SpO2 98%   BMI 26.38 kg/m²       Physical Exam:   Dressing:  clean, dry, intact Wiggles Toes/Ankle  Foot sensation intact to light touch  No foot edema/ +1 Posterior Tibial Pulse    Intake and Output:  Current Shift:  No intake/output data recorded.  Last three shifts:  05/27 1901 - 05/29 0700  In: 6010 [I.V.:10]  Out: 78023     Lab/Data Reviewed:  Recent Labs     05/27/24  0530   HGB 8.7*   HCT 27.3*   *   K 3.7   CL 99*   CO2 26   BUN 40*       Medications Reviewed    Assessment/Plan     Principal Problem:    Closed right hip fracture, initial encounter ([facility])  Active Problems:    Essential hypertension    Diabetes mellitus (HCC)    ESRD on peritoneal dialysis (HCC)    Fall  Resolved Problems:    * No resolved hospital problems. *      Discharge Planning per primary  OK to d/c from ortho standpoint  Appears Rehab vs home with HH due to PD concerns  Continue DVT Prophylaxis.  Follow up outpatient in 10-14 days with Dr. Rebolledo for clinical exam and repeat Xray of right hip.        The patient's plan of care discussed with by Dr. Rebolledo today as well and he is in agreement with above.         
    Progress Note      Patient: Carmen Ledesma               Sex: female          DOA: 5/24/2024     YOB: 1955      Age:  68 y.o.        LOS:  LOS: 3 days     Status Post: Procedure(s):  Right Hip Closed Reduction Fixation  Surgery Date: [unfilled]            Subjective:     Carmen Ledesma is a 68 y.o. female who c/o right hip pain reasonably well controlled with prn pain meds. Patient denies any complaint of calf pain/ SOB or difficulty breathing.    Objective:      Visit Vitals  /60   Pulse 69   Temp 97.7 °F (36.5 °C) (Temporal)   Resp 18   Ht 1.626 m (5' 4\")   Wt 63.9 kg (140 lb 14 oz)   SpO2 94%   BMI 24.18 kg/m²       Physical Exam:   Dressing:  clean, dry, intact  Wiggles Toes/Ankle  Foot sensation intact to light touch  No foot edema/ +1 Posterior Tibial Pulse        Intake and Output:  Current Shift:  05/27 0701 - 05/27 1900  In: 2000   Out: -   Last three shifts:  05/25 1901 - 05/27 0700  In: 8690 [P.O.:220; I.V.:2470]  Out: 6810 [Urine:700]  r    Lab/Data Reviewed:  Recent Labs     05/27/24  0530   HGB 8.7*   HCT 27.3*   *   K 3.7   BUN 40*         Medications Reviewed    Assessment/Plan     Principal Problem:    Closed right hip fracture, initial encounter ([facility])  Active Problems:    Essential hypertension    Diabetes mellitus ([facility])    ESRD on peritoneal dialysis ([facility])    Fall  Resolved Problems:    * No resolved hospital problems. *      Discharge Planning pending PT evaluation - likely rehab vs home with   Continue DVT Prophylaxis - Heparin  Continue meds per primary  Weightbear 50% right lower extremity  Recommend OOB with PT/OT eval  Follow up outpatient with Dr. Rebolledo in 10-14 days for repeat xray and clinical exam        The patient's plan of care discussed with Dr. Rebolledo today as well and he is in agreement with above.       
  Hospitalist Progress Note-critical care note     Patient: Carmen Ledesma MRN: [id number]  CSN: [id number]    YOB: 1955  Age: 68 y.o.  Sex: female    DOA: 5/24/2024 LOS:  LOS: 3 days            Chief complaint: esrd on PD , fall, DM type II     Assessment/Plan         Active Hospital Problems    Diagnosis Date Noted    Closed right hip fracture, initial encounter ([facility]) [S72.001A] 05/24/2024    ESRD on peritoneal dialysis (HCC) [N18.6, Z99.2] 05/24/2024    Fall [W19.XXXA] 05/24/2024    Diabetes mellitus (HCC) [E11.9] 12/04/2023    Essential hypertension [I10] 12/04/2023        Acute femoral neck fracture   Right Hip Closed Reduction Fixation  on 5/26, so far tolerated well   has been consulted continue pain control DVT prophylaxis  Pvl no  DVT  Fall precaution    Continue pt/ot      DM type II , with complication,  -use trulicity every Monday    Complication with renal failure   - ssi, diabetic diet , hypoglycemia protocol    Will add lantus at night       HTN, accelerated  Continue home medication.     Fall   Fall precaution, use walker at home       SUBJECTIVE feel better   Family was at  the bedside . All questions answered    TIME: E/M Time spent with patient and patient care issues: [] 31-40 mins  [] 41-49 mins  [x] 50 mins or more.      Disposition :per pt /ot recommendation for placement   Review of systems:    General: No fevers or chills.  Cardiovascular: No chest pain or pressure. No palpitations.   Pulmonary: No shortness of breath.   Gastrointestinal: No nausea, vomiting.     Vital signs/Intake and Output:  Visit Vitals  BP (!) 117/48   Pulse 77   Temp 99.5 °F (37.5 °C) (Oral)   Resp 18   Ht 1.626 m (5' 4\")   Wt 63.9 kg (140 lb 14 oz)   SpO2 97%   BMI 24.18 kg/m²     Current Shift:  05/27 0701 - 05/27 1900  In: 2000   Out: -   Last three shifts:  05/25 1901 - 05/27 0700  In: 8690 [P.O.:220; I.V.:2470]  Out: 6810 [Urine:700]        Physical Exam:  General: WD, WN.  Alert, cooperative, no 
7am dianeal bag started this morning but only drained into pt by 0820hrs. Dwell time was started then. Draining out of dialysate started at 1150hrs.   
AVS reviewed with pt and daughter Taylor Ledesma, all questions answered. Pt educated on medication uses and side effcts, follow up appointments, and S/S of MI and stroke. Pt and daughter verbalized understanding. IV removed, no complications. Telebox removed and returned. Pt in room with daughter waiting for medical transport.   
Assessment:       ESRD  DM  HTN  Anemia of CKD  S/p OR for hip fracture           Plan:      Epogen for anemia of ESRD  Calcitriol  Glycemic control as per hospitalits  Resume PD  Tracey rankin     CC: ESRD  Interval History:  feeling ok, no new complaints        Subjective:   PT does not have any somatic complaints           Review of Systems  Negative for  SOB,  Nausea, vomiting        Blood pressure (!) 117/48, pulse 77, temperature 99.5 °F (37.5 °C), temperature source Oral, resp. rate 18, height 1.626 m (5' 4\"), weight 63.9 kg (140 lb 14 oz), SpO2 97 %.    Intake/Output Summary (Last 24 hours) at 5/27/2024 1259  Last data filed at 5/27/2024 0715  Gross per 24 hour   Intake 4130 ml   Output 2800 ml   Net 1330 ml        NAD, Conversant    Psychicatric: good judgment and insights, alert and oreinted        Intake/Output Summary (Last 24 hours) at 5/27/2024 1259  Last data filed at 5/27/2024 0715  Gross per 24 hour   Intake 4130 ml   Output 2800 ml   Net 1330 ml      Recent Labs     05/27/24  0530   WBC 9.8     Lab Results   Component Value Date/Time     05/27/2024 05:30 AM    K 3.7 05/27/2024 05:30 AM    CL 99 05/27/2024 05:30 AM    CO2 26 05/27/2024 05:30 AM    BUN 40 05/27/2024 05:30 AM      Microbiology  [unfilled]   Current Facility-Administered Medications   Medication Dose Route Frequency Provider Last Rate Last Admin    sodium chloride flush 0.9 % injection 5-40 mL  5-40 mL IntraVENous 2 times per day Stephanie Gonzalez PA-C   10 mL at 05/27/24 0904    sodium chloride flush 0.9 % injection 5-40 mL  5-40 mL IntraVENous PRN Stephanie Gonzalez PA-C        0.9 % sodium chloride infusion   IntraVENous PRN Stephanie Gonzalez PA-C        acetaminophen (TYLENOL) tablet 650 mg  650 mg Oral Q6H Stephanie Gonzalez PA-C   650 mg at 05/27/24 0903    ceFAZolin (ANCEF) 2,000 mg in sterile water 20 mL IV syringe  2,000 mg IntraVENous Q24H Stephanie Gonzalez PA-C   2,000 mg at 05/27/24 0571    polyethylene glycol 
Assessment:   She is admitted for hip fracture after fall     ESRD  DM  HTN  Anemia of CKD  S/p OR for hip fracture           Plan:    continue PD, but will decrease to 4 exchange daily , each 4 hourly   Epogen for anemia of ESRD  Calcitriol for secondary hyperparathyroidism   Glycemic control as per hospitalist  Tracey rankin             Subjective:     Hip pain other wise doing fine   BP stable   PD going fine   No other concern       Blood pressure (!) 140/60, pulse 72, temperature 98.1 °F (36.7 °C), temperature source Temporal, resp. rate 18, height 1.626 m (5' 4\"), weight 63.9 kg (140 lb 14 oz), SpO2 98 %.    Intake/Output Summary (Last 24 hours) at 5/28/2024 1122  Last data filed at 5/28/2024 0358  Gross per 24 hour   Intake 8010 ml   Output 9500 ml   Net -1490 ml          NAD, Conversant    Psychicatric: good judgment and insights, alert and oreinted        Intake/Output Summary (Last 24 hours) at 5/28/2024 1122  Last data filed at 5/28/2024 0358  Gross per 24 hour   Intake 8010 ml   Output 9500 ml   Net -1490 ml        Recent Labs     05/27/24  0530   WBC 9.8       Lab Results   Component Value Date/Time     05/27/2024 05:30 AM    K 3.7 05/27/2024 05:30 AM    CL 99 05/27/2024 05:30 AM    CO2 26 05/27/2024 05:30 AM    BUN 40 05/27/2024 05:30 AM    @   Current Facility-Administered Medications   Medication Dose Route Frequency Provider Last Rate Last Admin    dianeal lo-jacki 1.5% 2,000 mL  2,000 mL IntraPERitoneal 4x daily Paul Pace MD        sodium chloride flush 0.9 % injection 5-40 mL  5-40 mL IntraVENous 2 times per day Stephanie Gonzalez PA-C   10 mL at 05/28/24 0755    sodium chloride flush 0.9 % injection 5-40 mL  5-40 mL IntraVENous PRN Stephanie Gonzalez PA-C        0.9 % sodium chloride infusion   IntraVENous PRN Stephanie Gonzalez PA-C        acetaminophen (TYLENOL) tablet 650 mg  650 mg Oral Q6H Stephanie Gonzalez PA-C   650 mg at 05/28/24 0757    polyethylene glycol (GLYCOLAX) packet 17 g  
DC Plan: pending recommendation from therapy    Care manager met with patient, daughter Taylor Mitchell and son Yogi at bedside; PT has not assessed patient for recommendation yet but HH list had been provided yesterday and this was still at bedside and discussed.  Patient has both RW and rollator; may need 3 in 1 commode but will wait for PT/OT recommendations - patient orders are for 50% WB on operative leg.  Son stated that patient could stay at his home so she will have 24/7 supervision.  Care management will follow for d/c recommendations.  
Hospital day a few    Subjective:     Ms. Ledesma denies chest pain, chest pressure/discomfort, claudication, dyspnea, exertional chest pressure/discomfort, fatigue, irregular heart beat, near-syncope, orthopnea, palpitations, paroxysmal nocturnal dyspnea, syncope, and tachypnea    Medication side effects: none    Scheduled Meds:   sodium chloride flush  5-40 mL IntraVENous 2 times per day    acetaminophen  650 mg Oral Q6H    [START ON 5/27/2024] ceFAZolin (ANCEF) IVPB  2,000 mg IntraVENous Q24H    epoetin lakeisha-epbx  6,000 Units SubCUTAneous Once per day on Tue Thu Sat    calcitRIOL  0.25 mcg Oral Daily    insulin glargine  5 Units SubCUTAneous Daily    sodium chloride flush  5-40 mL IntraVENous 2 times per day    heparin (porcine)  5,000 Units SubCUTAneous 3 times per day    insulin lispro  0-8 Units SubCUTAneous TID WC    insulin lispro  0-4 Units SubCUTAneous Nightly    dianeal lo-jacki 1.5%  2,000 mL IntraPERitoneal 5x Daily    atenolol  25 mg Oral Daily     Continuous Infusions:   sodium chloride      sodium chloride 10 mL/hr (05/26/24 0936)    dextrose       PRN Meds:sodium chloride flush, sodium chloride, oxyCODONE-acetaminophen, hydrALAZINE, sodium chloride flush, sodium chloride, ondansetron **OR** ondansetron, polyethylene glycol, acetaminophen **OR** acetaminophen, glucose, dextrose bolus **OR** dextrose bolus, glucagon (rDNA), dextrose, HYDROmorphone, naloxone      Objective:      Physical Exam:  HEENT: Perrla, EOMI; oral mucosa well perfused; conjunctiva not injected, sclera anicteric, no thrush or xanthelasma  Neck: No JVD, negative carotid bruits, normal pulses; no thyromegaly, trachea midline  Resp: Clear to auscultation bilaterally; No wheezes or rales, normal effort no accessory muscle use  Cardiovascular: regular rhythm normal s1and s2; + murmurs or rubs; PMI not displaced  Carotid pulses 2+,no bruit, no abdominal bruit, palpable femoral and DP pulses  Abd: Positive Bowel Sounds, Soft, Nontender 
In brief-  She is ESRD pt on PD being admitted for hip fracture    Continue PD- 2000 ml, 1.5% dextrose total of 5 exchange every day - each exchange is for 4 hours  Out of 4 hours 15 min fill time, 15 min drain time and 210 min dwell time  For hypertension she takes atenolol and losartan   Will see in morning     Paul Pace MD, CATE   [facility]   
[facility] does not have an available bed at this time. NEAL reached out to son regarding home with [facility]. Son agreeable with plan and stated that between him and his sister they could assist at home along with home care. NEAL also spoke with family regarding private duty options. [facility] referral placed; orders to follow. Plan likely to DC home today.   
No pain  Pt only wanted certain meds even after education.   Pt finally agreed to Peritoneal dialysis.   Pt is dwelling now.   
Occupational Therapy Goals:  Initiated 5/27/2024 to be met within 7-10 days.  Short Term Goals  Time Frame for Short Term Goals: 7 days  Short Term Goal 1: Patient will complete grooming EOB with setup A  Short Term Goal 2: Patient will complete bed to chair/bsc transfer with min A  Short Term Goal 3: Patient will complete LBD with min A and adaptive equipment as needed  Short Term Goal 4: Patient will complete bathing with min A  Short Term Goal 5: Patient will complete toileting with min A    OCCUPATIONAL THERAPY EVALUATION    Patient: Carmen Ledesma (68 y.o. female)  Date: 5/27/2024  Primary Diagnosis: Closed right hip fracture, initial encounter ([facility]) [S72.001A]  Fall from ground level [W18.30XA]  Pain in both lower extremities [M79.604, M79.605]  Procedure(s) (LRB):  Right Hip Closed Reduction Fixation (Right) 1 Day Post-Op   Precautions: Weight Bearing, Fall Risk, Right Lower Extremity Weight Bearing: Partial Weight Bearing,  ,  ,  ,  ,  ,    PLOF: Patient completed ADLs independently other than having difficulty for showers.    ASSESSMENT :  Patient presented supine in bed with  PD dwelling . Patient with visitor present for part of session. Patient completed assessment of ADLs and BUE strength, ROM (see details below). Patient with generalized weakness of BUEs. Patient reporting \"I already got up with therapy\" and reporting being tired. Patient educated on plan of care, educated on recommendations for acquiring equipment for the bathroom including grab bars and shower chair vs tub transfer bench. Patient verbalized understanding of education. Due to deficits (listed below) patient has decreased independence with ADLs and functional mobility and would benefit from SNF  ,      DEFICITS/IMPAIRMENTS:  Performance deficits / Impairments: Decreased functional mobility ;Decreased high-level IADLs;Decreased ADL status;Decreased endurance;Decreased fine motor control;Decreased strength;Decreased balance;Decreased 
PD dialysis with Dianeal lo-jacki intraPERITONAL started, volume 2000 ml,completed in 20 minutes.Clamped for 4 hours.  Unclamped drainage obtained 2500 ml.  0045 Daneal lo jacki, 2000 ml via PD given. Clamped for 4 hours as ordered.  0520 unclamped, drainage obtained 2800 ml  
PD exchange 4 completed. 1600 ml out.   
Patient medicated with percocet 1 tab for c/o of pain.  0540 Patient crying in pain ,percocet ineffective, dilaudid 1mg iv given.  
[facility]. SW spoke with patient regarding transport home. Patient requesting transport home at 330pm due to her daughter being home at that time. SW to reach out to [facility]  [phone]  regarding stretcher transport needed. Time confirmed for 4:30 pm.   
Patient up in bed, PD currently dwelling, no current needs or complaints. Call light in reach.  
Pharmacy Dosing Services:     Pharmacist Renal Dosing  Note for Atenolol  Physician Dr. Tovar    Indication:   Previous Regimen Atenolol 50 mg Daily   Serum Creatinine No results found for: \"ANN MARIE\", \"CREAPOC\"   Creatinine Clearance Estimated Creatinine Clearance: 7 mL/min (A) (based on SCr of 6.88 mg/dL (H)).   BUN Lab Results   Component Value Date/Time    BUN 60 05/24/2024 12:38 PM       New Regimen:   Atenolol 25 mg QD    Benny Chang RPH , PharmD  [phone]   
Physical Therapy Evaluation Attempt    Chart reviewed.  Pt unable to participate in physical therapy session due to:    []  Eating meal  []  Nausea  []  Dizziness  []  Lethargy  []  Lab Results  []  Blood Transfusion  []  Dialysis  []  Pain  [x]  Other:  Nurse Monisha w/ pt stating she was starting PD.    Will attempt tomorrow as pt schedule allows.    Tash Batista PT   
Physical Therapy Goals:  Initiated 5/27/2024   Short Term Goals  Time Frame for Short Term Goals: 1-7 days  Short Term Goal 1: Pt will be able to transfer supine<>sit CGA to improve functional mobility.  Short Term Goal 2: Pt will be able to transfer sit<>stand CGA to improve independence.  Short Term Goal 3: Pt will be able to ambulate >25' w/ RW, R LE 50% PWB, GB and CGA to improve ability to negotiate environment.    []  Patient has met MD kaley haley for d/c home   []  Recommend HH with 24 hour adult care   [x]  Benefit from additional acute PT session to address:  transfer training, gait training    PHYSICAL THERAPY EVALUATION    Patient: Carmen Ledesma (68 y.o. female)  Date: 5/27/2024  Primary Diagnosis: Closed right hip fracture, initial encounter ([facility]) [S72.001A]  Fall from ground level [W18.30XA]  Pain in both lower extremities [M79.604, M79.605]  Procedure(s) (LRB):  Right Hip Closed Reduction Fixation (Right) 1 Day Post-Op   Precautions: Weight Bearing, Fall Risk, Right Lower Extremity Weight Bearing: Partial Weight Bearing,  ,  ,  ,  ,  ,    PLOF:  pt lives w/ daughter in ground level apt; uses rollator for ambulation-multiple falls w/ most recent resulting in R hip fx; performs PD 5x/day w/ A from daughter  ASSESSMENT :  Based on the objective data described below, the patient presents with decreased mobility in regards to bed mobility, transfers, gait quality, gait tolerance, balance and safety due to R hip surgery.  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  Using numerical pain scale, pt rated pain 3/10 pre/post and 5/10 during session.  Pt and daughter ed regarding mobility safety, PWB 50% R LE, HEP, ice application/use, elevation, environmental safety and need to use call bell for activity.  Pt supine in bed groaning lightly upon arrival.  
Pt continues to ask for food. Educated but still continues to ask for food. Surgery is scheduled this morning.    
Pt does not want to be capped to dwell. She stated she did not have enough of her caps from home an we do not carry them. Caregiver at the bedside stated this is the way they do it at home an please leave her connected to the line to dwell to use one cap instead of changing it twice. Educated on infections etc. Pt would like to do it her way.   
Pt is more alert, no pain reported. Vitals WNL.   Dressing clean dry and intact. No concerns at this time.   FAMILY AT THE BEDSIDE   
Pt is refusing Peritoneal dialysis at this time. Family is at the bedside.   
Pt was unclamped to drain.   
Remote access to review chart  Noted CT hip with acute right femoral neck fracture.   Will require surgical intervention with ORIF  Pending preop medical clearance and dialysis treatment  Full note to follow.  
Renal Dosing Adjustment     The following medication: Cefazolin was automatically dose-adjusted per approved Protocols, with respect to renal function.    Pt Weight:   Wt Readings from Last 1 Encounters:   05/26/24 63.9 kg (140 lb 14 oz)       Previous Regimen                                            Cefazolin 2 gram IV q8h x 2 doses   Creatinine Clearance Estimated Creatinine Clearance: 8 mL/min (A) (based on SCr of 5.75 mg/dL (H)).   BUN Lab Results   Component Value Date/Time    BUN 44 05/26/2024 02:12 AM         Assessment/Plan    Peritoneal dialysis pt  Dose adjusted to Cefazolin 2 grams IV q24h x 2 doses.  Pharmacy to continue to monitor patient daily.       Signed Kareen Esteban RPH  Contact: [phone]  
The patient has an order of peritoneal dialysis since 1645 before admission to the unit. The supply for PD were not complete. The nurse supervisor said we are going to cancel 1645 dialysis and start on the next dialysis at 1900.   
PERRLA, anicteric sclerae.  Lungs: CTA Bilaterally. No Wheezing/Rhonchi/Rales.  Heart:  Regular  rhythm,  No murmur, No Rubs, No Gallops  Abdomen: Soft, mild  distended, Non tender.  +Bowel sounds, PD catheter noted   Extremities: No c/c/e  Psych:   Not anxious or agitated.  Neurologic:  No acute neurological deficit.             Labs: Results:       Chemistry Recent Labs     05/24/24  1238 05/25/24  0345    137   K 4.7 3.8    103   CO2 28 24   BUN 60* 52*   GLOB 3.5  --       CBC w/Diff Recent Labs     05/24/24  1238 05/25/24  0345   WBC 11.7 8.9   RBC 2.95* 2.89*   HGB 9.3* 9.1*   HCT 28.8* 27.9*    294      Cardiac Enzymes No results for input(s): \"CPK\" in the last 72 hours.    Invalid input(s): \"CKRMB\", \"CKND1\", \"TROIP\", \"TYLOR\"   Coagulation Recent Labs     05/24/24  1238   INR 1.0       Lipid Panel No results found for: \"CHOL\", \"CHOLPOCT\", \"CHLST\", \"CHOLV\", \"555089\", \"HDL\", \"HDLC\", \"LDL\", \"VLDLC\", \"VLDL\"   BNP Invalid input(s): \"BNPP\"   Liver Enzymes No results for input(s): \"TP\" in the last 72 hours.    Invalid input(s): \"ALB\", \"TBIL\", \"AP\", \"SGOT\", \"GPT\", \"DBIL\"   Thyroid Studies No results found for: \"T4\", \"T3RU\", \"TSH\"     Procedures/imaging: see electronic medical records for all procedures/Xrays and details which were not copied into this note but were reviewed prior to creation of Plan    XR CHEST PORTABLE    Result Date: 5/24/2024  EXAM: XR CHEST PORTABLE ACC#: [id number]  INDICATION: Preop, []  COMPARISON: Chest radiograph December 7, 2023 FINDINGS: AP portable chest radiograph. The heart size is normal. The lungs are adequately expanded. Mild scarring or atelectasis is seen in the right middle lung zone. No consolidation is seen. There is no evidence of effusion or pneumothorax.     No acute cardiopulmonary findings. Mild atelectasis or scarring in the right middle lung zone.     CT HIP RIGHT WO CONTRAST    Result Date: 5/24/2024  EXAM: CT HIP RIGHT WO CONTRAST INDICATION: Eval for 
Soft, Nontender nondistended; No hepatosplenomegaly  Ext: No clubbing, cyanosis, no edema warm and well perfused.  Right leg externally rotated.  Right hip tender.  Pulses: 2+ DP/PT/Rad  Neuro: Alert and oriented X3, Nonfocal; Muscle strength and tone symmetric, moves all 4 extremities  Skin: Warm, Dry, Intact    Cardiographics  ECG: No acute change.  Echocardiogram: See report    Imaging  Chest X-Ray: Images and report viewed    Lab Review   Lab Results   Component Value Date/Time     05/27/2024 05:30 AM     05/26/2024 02:12 AM     05/25/2024 03:45 AM    K 3.7 05/27/2024 05:30 AM    K 3.8 05/26/2024 02:12 AM    K 3.8 05/25/2024 03:45 AM    CO2 26 05/27/2024 05:30 AM    CO2 27 05/26/2024 02:12 AM    CO2 24 05/25/2024 03:45 AM    BUN 40 05/27/2024 05:30 AM    BUN 44 05/26/2024 02:12 AM    BUN 52 05/25/2024 03:45 AM    CREATININE 5.58 05/27/2024 05:30 AM    CREATININE 5.75 05/26/2024 02:12 AM    CREATININE 6.39 05/25/2024 03:45 AM    GLUCOSE 192 05/27/2024 05:30 AM    GLUCOSE 226 05/26/2024 02:12 AM    GLUCOSE 231 05/25/2024 03:45 AM    CALCIUM 8.4 05/27/2024 05:30 AM    CALCIUM 8.6 05/26/2024 02:12 AM    CALCIUM 8.7 05/25/2024 03:45 AM     Lab Results   Component Value Date/Time    CKTOTAL 103 05/24/2024 12:38 PM     Lab Results   Component Value Date/Time    WBC 9.8 05/27/2024 05:30 AM    WBC 9.6 05/26/2024 02:12 AM    WBC 8.9 05/25/2024 03:45 AM    HGB 8.7 05/27/2024 05:30 AM    HGB 8.5 05/26/2024 02:12 AM    HGB 9.1 05/25/2024 03:45 AM    HCT 27.3 05/27/2024 05:30 AM    HCT 25.6 05/26/2024 02:12 AM    HCT 27.9 05/25/2024 03:45 AM    MCV 97.2 05/27/2024 05:30 AM    MCV 94.8 05/26/2024 02:12 AM    MCV 96.5 05/25/2024 03:45 AM     05/27/2024 05:30 AM     05/26/2024 02:12 AM     05/25/2024 03:45 AM       Assessment:      hypertension  Patient Active Problem List    Diagnosis Date Noted    Closed right hip fracture, initial encounter (HCC) 05/24/2024    ESRD on peritoneal dialysis 
endurance and pain. Pt with notable improvements in her courage and motivation to move (see subjective). Nurse Keisha informed of session outcomes and POC. IPR continues to be recommended upon d/c.    Progression toward goals:   []      Improving appropriately and progressing toward goals  [x]      Improving slowly and progressing toward goals  []      Not making progress toward goals and plan of care will be adjusted     PLAN:  Patient continues to benefit from skilled intervention to address the above impairments.  Continue treatment per established plan of care.    Further Equipment Recommendations for Discharge: none    [facility]:   AM-PAC Inpatient Mobility without Stair Climbing Raw Score : 12    Current research shows that an AM-PAC score of 17 (13 without stairs) or less is not associated with a discharge to the patient's home setting. Based on an AM-PAC score and their current functional mobility deficits, it is recommended that the patient have 5-7 sessions per week of Physical Therapy at d/c to increase the patient's independence.  Currently, this patient demonstrates the potential endurance, and/or tolerance for 3 hours of therapy each day at d/c.    Current research shows that an AM-PAC score of 17 (13 without stairs) or less is not associated with a discharge to the patient's home setting. Based on an AM-PAC score and their current functional mobility deficits, it is recommended that the patient have 3-5 sessions per week of Physical Therapy at d/c to increase the patient's independence.     Current research shows that an AM-PAC score of 18 (14 without stairs) or greater is associated with a discharge to the patient's home setting. Based on an AM-PAC score and their current functional mobility deficits, it is recommended that the patient have 2-3 sessions per week of Physical Therapy at d/c to increase the patient's independence.    At this time and based on an AM-PAC score, no further PT is recommended upon 
lymphadenopathy  CVS:Regular rate and rhythm, no M/R/G, S1/S2 heard, no thrill  Lungs:Clear to auscultation bilaterally, no wheezes, rhonchi, or rales  Abdomen: Soft, Nontender, No distention, Normal Bowel sounds, No hepatomegaly  Extremities: No C/C/E, pulses palpable 2+  Skin:normal texture and turgor, no rashes, no lesions  Neuro:grossly normal , follows commands  Psych:appropriate    Labs: Results:       Chemistry Recent Labs     05/26/24 0212 05/27/24  0530   GLUCOSE 226* 192*    135*   K 3.8 3.7    99*   CO2 27 26   BUN 44* 40*   CREATININE 5.75* 5.58*   CALCIUM 8.6 8.4*   LABGLOM 8* 8*      CBC w/Diff Recent Labs     05/26/24 0212 05/27/24  0530   WBC 9.6 9.8   RBC 2.70* 2.81*   HGB 8.5* 8.7*   HCT 25.6* 27.3*    359   LYMPHOPCT 23 30      Cardiac Enzymes No results for input(s): \"CKTOTAL\", \"CKMB\", \"CKMBINDEX\", \"TROPONINI\" in the last 72 hours.    Invalid input(s): \"TYLOR\"   Coagulation No results for input(s): \"PROTIME\", \"INR\", \"APTT\" in the last 72 hours.    Lipid Panel No results found for: \"CHOL\", \"TRIG\", \"HDL\", \"VLDL\", \"CHOLHDLRATIO\"   BNP No results for input(s): \"BNP\" in the last 72 hours.   Liver Enzymes No results for input(s): \"ALT\", \"AST\", \"GGT\", \"ALKPHOS\", \"BILITOT\", \"BILIDIR\" in the last 72 hours.   Thyroid Studies No results found for: \"D7QGDKF\", \"D0SLHZX\", \"FT3\", \"T4FREE\", \"TSH\"       Procedures/imaging: see electronic medical records for all procedures/Xrays and details which were not copied into this note but were reviewed prior to creation of Plan      Olu Burgess MD    TIME: E/M Time spent with patient and patient care issues: [] 15-20 min  [] 21-30 min  [x] 31-44 min  [] 45 min or more.     This time also includes physician non-face-to-face service time visit on the date of service such as  Preparing to see the patient (eg, review of tests)  Obtaining and/or reviewing separately obtained history  Performing a medically necessary appropriate examination and/or 
polyethylene glycol (GLYCOLAX) packet 17 g  17 g Oral Daily Tyrel Adams R, DO   17 g at 05/28/24 0754    oxyCODONE-acetaminophen (PERCOCET)  MG per tablet 1 tablet  1 tablet Oral Q4H PRN Sharon Tovar MD   1 tablet at 05/27/24 0549    epoetin lakeisha-epbx (RETACRIT) injection 6,000 Units  6,000 Units SubCUTAneous Once per day on Tue Thu Sat Tyrel Adams R DO   6,000 Units at 05/28/24 1729    calcitRIOL (ROCALTROL) capsule 0.25 mcg  0.25 mcg Oral Daily Tyrel Adams, DO   0.25 mcg at 05/29/24 0822    insulin glargine (LANTUS) injection vial 5 Units  5 Units SubCUTAneous Daily Sharon Tovar MD   5 Units at 05/29/24 0822    hydrALAZINE (APRESOLINE) injection 10 mg  10 mg IntraVENous Q6H PRN Sharon Tovar MD   10 mg at 05/26/24 0718    sodium chloride flush 0.9 % injection 5-40 mL  5-40 mL IntraVENous 2 times per day Sharon Tovar MD   10 mL at 05/29/24 0825    sodium chloride flush 0.9 % injection 5-40 mL  5-40 mL IntraVENous PRN Sharon Tovar MD        0.9 % sodium chloride infusion   IntraVENous PRN Sharon Tovar MD 10 mL/hr at 05/26/24 0936 10 mL/hr at 05/26/24 0936    heparin (porcine) injection 5,000 Units  5,000 Units SubCUTAneous 3 times per day Sharon Tovar MD   5,000 Units at 05/29/24 0539    ondansetron (ZOFRAN-ODT) disintegrating tablet 4 mg  4 mg Oral Q8H PRN Sharon Tovar MD   4 mg at 05/27/24 1318    Or    ondansetron (ZOFRAN) injection 4 mg  4 mg IntraVENous Q6H PRN Sharon Tovar MD   4 mg at 05/26/24 0719    polyethylene glycol (GLYCOLAX) packet 17 g  17 g Oral Daily PRN Sharon Tovar MD   17 g at 05/25/24 0411    acetaminophen (TYLENOL) tablet 650 mg  650 mg Oral Q6H PRN Sharon Tovar MD        Or    acetaminophen (TYLENOL) suppository 650 mg  650 mg Rectal Q6H PRN Sharon Tovar MD        glucose chewable tablet 16 g  4 tablet Oral PRN Sharon Tovar MD        dextrose bolus 10% 125 mL  125 mL IntraVENous PRN Sharon Tovar MD        Or    dextrose bolus 10% 250 mL  250 mL IntraVENous Sharon Verdugo MD        glucagon injection 1 mg  1 mg SubCUTAneous PRN Sharon Tovar MD  
    No acute cardiopulmonary findings. Mild atelectasis or scarring in the right middle lung zone.     CT HIP RIGHT WO CONTRAST    Result Date: 5/24/2024  EXAM: CT HIP RIGHT WO CONTRAST INDICATION: Eval for Right hip fracture  COMPARISON: Pelvis and right hip radiographs 5/24/2024 TECHNIQUE: Helical CT of the right hip with coronal and sagittal reformats. Images reviewed in soft tissue and bone windows.  CT dose reduction was achieved through the use of a standardized protocol tailored for this examination and automatic exposure control for dose modulation. CONTRAST: None. FINDINGS: Bones: Acute mildly anteromedially displaced impacted valgus angulated subcapital-transcervical femoral neck fracture. No dislocation. Joint fluid: Moderate hip joint effusion. Articulations: Degenerative changes. Tendons: No definite full-thickness tendon tear. Muscles: No intramuscular hematoma. No focal atrophy. Soft tissues: No appreciable mass. Ascites. Atherosclerosis. Pelvic peritoneal catheter.     Acute femoral neck fracture.    XR HIP 2-3 VW W PELVIS RIGHT    Result Date: 5/24/2024  EXAM: XR HIP 2-3 VW W PELVIS RIGHT INDICATION: Right hip and thigh pain. COMPARISON: None. FINDINGS: AP view of the pelvis and a frogleg lateral view of the right hip are suboptimally positioned. Angulation and cortical irregularity of the right femoral neck may represent a ring of osteophytes or a mildly displaced subcapital femoral neck fracture.     Right subcapital femoral neck fracture, versus ring of osteophytes. Suboptimally positioned radiographs.      GOKUL BURLESON MD  
Units at 05/25/24 1202    insulin lispro (HUMALOG,ADMELOG) injection vial 0-4 Units  0-4 Units SubCUTAneous Nightly Sharon Tovar MD        dianeal lo-jacki 1.5% 2,000 mL  2,000 mL IntraPERitoneal 5x Daily Paul Pace MD   Held at 05/26/24 0700    HYDROmorphone HCl PF (DILAUDID) injection 1 mg  1 mg IntraVENous Q4H PRN Sharon Tovar MD   1 mg at 05/25/24 0531    naloxone (NARCAN) injection 0.4 mg  0.4 mg IntraVENous PRN Sharon Tovar MD        atenolol (TENORMIN) tablet 25 mg  25 mg Oral Daily Sharon Tovar MD   25 mg at 05/25/24 1022       
less is not associated with a discharge to the patient's home setting. Based on an AM-PAC score and their current functional mobility deficits, it is recommended that the patient have 3-5 sessions per week of Physical Therapy at d/c to increase the patient's independence.     Current research shows that an AM-PAC score of 18 (14 without stairs) or greater is associated with a discharge to the patient's home setting. Based on an AM-PAC score and their current functional mobility deficits, it is recommended that the patient have 2-3 sessions per week of Physical Therapy at d/c to increase the patient's independence.    At this time and based on an AM-PAC score, no further PT is recommended upon discharge due to (i.e. patient at baseline functional status…etc…).  Recommend patient returns to prior setting with prior services.    This [facility] score should be considered in conjunction with interdisciplinary team recommendations to determine the most appropriate discharge setting. Patient's social support, diagnosis, medical stability, and prior level of function should also be taken into consideration.     SUBJECTIVE:   Patient stated: \"I only eat 2 meals a day. Breakfast and Dinner...My daughter prepares it [breakfast] for me before she leaves in the morning.\"    Patient stated: \"My granddaughter thought I wasn't going to make it [to her graduation], but I told her 'Oh no, I'm working on it!'\"    OBJECTIVE DATA SUMMARY:   Critical Behavior:  Orientation  Overall Orientation Status: Within Functional Limits  Orientation Level: Oriented to situation;Oriented to person;Oriented to place  Cognition  Overall Cognitive Status: WFL  Functional Mobility Training:  Bed Mobility:  Bed Mobility Training  Bed Mobility Training: Yes  Overall Level of Assistance: Additional time;Minimum assistance;Contact-guard assistance  Interventions: Safety awareness training;Verbal cues  Supine to Sit: Contact-guard assistance;Additional

## 2024-05-29 NOTE — DISCHARGE SUMMARY
Discharge Summary    Patient: Carmen Ledesma MRN: [id number]  CSN: [id number]    YOB: 1955  Age: 68 y.o.  Sex: female    DOA: 5/24/2024 LOS:  LOS: 5 days   Discharge Date:      Primary Care Provider:  Marie Ziegler MD    Admission Diagnoses: Closed right hip fracture, initial encounter ([facility]) [S72.001A]  Fall from ground level [W18.30XA]  Pain in both lower extremities [M79.604, M79.605]    Discharge Diagnoses:    Active Hospital Problems    Diagnosis     Closed right hip fracture, initial encounter ([facility]) [S72.001A]     ESRD on peritoneal dialysis ([facility]) [N18.6, Z99.2]     Fall [W19.XXXA]     Diabetes mellitus ([facility]) [E11.9]     Essential hypertension [I10]          Discharge Medications:        Medication List        START taking these medications      atenolol 25 MG tablet  Commonly known as: TENORMIN  Take 1 tablet by mouth daily  Start taking on: May 30, 2024     calcitRIOL 0.25 MCG capsule  Commonly known as: ROCALTROL  Take 1 capsule by mouth daily  Start taking on: May 30, 2024     oxyCODONE-acetaminophen 5-325 MG per tablet  Commonly known as: Percocet  Take 1 tablet by mouth every 8 hours as needed for Pain for up to 3 days. Intended supply: 3 days. Take lowest dose possible to manage pain Max Daily Amount: 3 tablets            CONTINUE taking these medications      epoetin lakeisha-epbx 88250 UNIT/ML Soln injection  Commonly known as: RETACRIT  Inject 1 mL into the skin Every Monday, Wednesday, and Friday With dialysis     metoclopramide 5 MG tablet  Commonly known as: REGLAN  Take 1 tablet by mouth 3 times daily (before meals)               Where to Get Your Medications        These medications were sent to [facility]/pharmacy #[id number] - [place], [place] - [address] - P [phone] - F [phone] [address] [address]      Phone: [phone]   oxyCODONE-acetaminophen 5-325 MG per tablet       Information about where to get these medications is not

## 2024-05-29 NOTE — CARE COORDINATION
05/29/24 1053   IMM Letter   IMM Letter given to Patient/Family/Significant other/Guardian/POA/by: Mindi Chicas CMS   IMM Letter date given: 05/29/24   IMM Letter time given: 1014

## 2024-05-29 NOTE — CARE COORDINATION
05/29/24 1015   IMM Letter   IMM Letter given to Patient/Family/Significant other/Guardian/POA/by: Mindi Chicas CMS   IMM Letter date given: 05/29/24   IMM Letter time given: 1012

## 2024-05-29 NOTE — PLAN OF CARE
Problem: Discharge Planning  Goal: Discharge to home or other facility with appropriate resources  5/25/2024 0214 by Theresa George RN  Outcome: Progressing  Flowsheets (Taken 5/24/2024 2041)  Discharge to home or other facility with appropriate resources: Identify barriers to discharge with patient and caregiver  5/24/2024 1844 by Sorin Marina RN  Outcome: Progressing     Problem: Pain  Goal: Verbalizes/displays adequate comfort level or baseline comfort level  5/25/2024 0214 by Theresa George RN  Outcome: Progressing  5/24/2024 1844 by Sorin Marina RN  Outcome: Progressing     Problem: Safety - Adult  Goal: Free from fall injury  Outcome: Progressing     
  Problem: Discharge Planning  Goal: Discharge to home or other facility with appropriate resources  5/26/2024 2219 by Lina Powell RN  Outcome: Progressing  Flowsheets (Taken 5/26/2024 2000)  Discharge to home or other facility with appropriate resources:   Identify barriers to discharge with patient and caregiver   Arrange for needed discharge resources and transportation as appropriate   Identify discharge learning needs (meds, wound care, etc)  5/26/2024 0943 by Monisha Andrade RN  Outcome: Progressing  Flowsheets (Taken 5/26/2024 0027 by Lina Powell, RN)  Discharge to home or other facility with appropriate resources:   Identify barriers to discharge with patient and caregiver   Arrange for needed discharge resources and transportation as appropriate   Identify discharge learning needs (meds, wound care, etc)     Problem: Pain  Goal: Verbalizes/displays adequate comfort level or baseline comfort level  5/26/2024 2219 by Lina Powell RN  Outcome: Progressing  Flowsheets (Taken 5/26/2024 2000)  Verbalizes/displays adequate comfort level or baseline comfort level:   Encourage patient to monitor pain and request assistance   Assess pain using appropriate pain scale   Administer analgesics based on type and severity of pain and evaluate response  5/26/2024 0943 by Monisha Andrade RN  Outcome: Progressing     Problem: Safety - Adult  Goal: Free from fall injury  5/26/2024 2219 by Lina Powell RN  Outcome: Progressing  Flowsheets (Taken 5/26/2024 2000)  Free From Fall Injury:   Instruct family/caregiver on patient safety   Based on caregiver fall risk screen, instruct family/caregiver to ask for assistance with transferring infant if caregiver noted to have fall risk factors  5/26/2024 0943 by Monisha Andrade RN  Outcome: Progressing     Problem: Skin/Tissue Integrity - Adult  Goal: Skin integrity remains intact  5/26/2024 2219 by Lina Powell RN  Outcome: Progressing  Flowsheets (Taken 
  Problem: Discharge Planning  Goal: Discharge to home or other facility with appropriate resources  5/29/2024 0932 by Isabelle Golden RN  Outcome: Progressing  5/28/2024 2249 by Arely Guzman RN  Outcome: Progressing  Flowsheets (Taken 5/28/2024 1945)  Discharge to home or other facility with appropriate resources:   Identify barriers to discharge with patient and caregiver   Arrange for needed discharge resources and transportation as appropriate     Problem: Pain  Goal: Verbalizes/displays adequate comfort level or baseline comfort level  5/29/2024 0932 by Isabelle Golden RN  Outcome: Progressing  5/28/2024 2249 by Arely Guzman RN  Outcome: Progressing  Flowsheets  Taken 5/28/2024 1945 by Arely Guzman RN  Verbalizes/displays adequate comfort level or baseline comfort level:   Encourage patient to monitor pain and request assistance   Assess pain using appropriate pain scale   Administer analgesics based on type and severity of pain and evaluate response  Taken 5/28/2024 1700 by Keisha Schaeffer RN  Verbalizes/displays adequate comfort level or baseline comfort level:   Encourage patient to monitor pain and request assistance   Assess pain using appropriate pain scale  Taken 5/28/2024 1101 by Keisha Schaeffer RN  Verbalizes/displays adequate comfort level or baseline comfort level:   Encourage patient to monitor pain and request assistance   Assess pain using appropriate pain scale     Problem: Safety - Adult  Goal: Free from fall injury  5/29/2024 0932 by Isabelle Golden RN  Outcome: Progressing  5/28/2024 2249 by Arely Guzman RN  Outcome: Progressing     Problem: Skin/Tissue Integrity - Adult  Goal: Skin integrity remains intact  5/29/2024 0932 by Isabelle Golden RN  Outcome: Progressing  5/28/2024 2249 by Arely Guzman RN  Outcome: Progressing  Flowsheets (Taken 5/28/2024 1945)  Skin Integrity Remains Intact:   Monitor for areas of redness and/or skin breakdown   Assess vascular access sites 
  Problem: Discharge Planning  Goal: Discharge to home or other facility with appropriate resources  Outcome: Progressing     Problem: Pain  Goal: Verbalizes/displays adequate comfort level or baseline comfort level  Outcome: Progressing     
  Problem: Discharge Planning  Goal: Discharge to home or other facility with appropriate resources  Outcome: Progressing  Flowsheets (Taken 5/25/2024 2007)  Discharge to home or other facility with appropriate resources:   Identify barriers to discharge with patient and caregiver   Arrange for needed discharge resources and transportation as appropriate   Identify discharge learning needs (meds, wound care, etc)     Problem: Pain  Goal: Verbalizes/displays adequate comfort level or baseline comfort level  5/26/2024 0011 by Lina Powell RN  Outcome: Progressing  Flowsheets (Taken 5/25/2024 2007)  Verbalizes/displays adequate comfort level or baseline comfort level:   Encourage patient to monitor pain and request assistance   Assess pain using appropriate pain scale   Administer analgesics based on type and severity of pain and evaluate response  5/25/2024 1254 by Alena Navarro RN  Outcome: Progressing  Flowsheets (Taken 5/25/2024 1254)  Verbalizes/displays adequate comfort level or baseline comfort level:   Encourage patient to monitor pain and request assistance   Assess pain using appropriate pain scale   Administer analgesics based on type and severity of pain and evaluate response   Implement non-pharmacological measures as appropriate and evaluate response     Problem: Safety - Adult  Goal: Free from fall injury  5/26/2024 0011 by Lina Powell RN  Outcome: Progressing  Flowsheets (Taken 5/25/2024 2007)  Free From Fall Injury:   Instruct family/caregiver on patient safety   Based on caregiver fall risk screen, instruct family/caregiver to ask for assistance with transferring infant if caregiver noted to have fall risk factors  5/25/2024 1254 by Alena Navarro RN  Outcome: Progressing  Flowsheets (Taken 5/25/2024 1254)  Free From Fall Injury: Instruct family/caregiver on patient safety     Problem: Skin/Tissue Integrity - Adult  Goal: Skin integrity remains intact  5/26/2024 0011 by Andre 
  Problem: Discharge Planning  Goal: Discharge to home or other facility with appropriate resources  Outcome: Progressing  Flowsheets (Taken 5/28/2024 1945)  Discharge to home or other facility with appropriate resources:   Identify barriers to discharge with patient and caregiver   Arrange for needed discharge resources and transportation as appropriate     Problem: Pain  Goal: Verbalizes/displays adequate comfort level or baseline comfort level  Outcome: Progressing  Flowsheets  Taken 5/28/2024 1945 by Arely Guzman RN  Verbalizes/displays adequate comfort level or baseline comfort level:   Encourage patient to monitor pain and request assistance   Assess pain using appropriate pain scale   Administer analgesics based on type and severity of pain and evaluate response  Taken 5/28/2024 1700 by Keisha Schaeffer RN  Verbalizes/displays adequate comfort level or baseline comfort level:   Encourage patient to monitor pain and request assistance   Assess pain using appropriate pain scale  Taken 5/28/2024 1101 by Keisha Schaeffer RN  Verbalizes/displays adequate comfort level or baseline comfort level:   Encourage patient to monitor pain and request assistance   Assess pain using appropriate pain scale     Problem: Safety - Adult  Goal: Free from fall injury  Outcome: Progressing     Problem: Skin/Tissue Integrity - Adult  Goal: Skin integrity remains intact  Outcome: Progressing  Flowsheets (Taken 5/28/2024 1945)  Skin Integrity Remains Intact:   Monitor for areas of redness and/or skin breakdown   Assess vascular access sites hourly  Goal: Incisions, wounds, or drain sites healing without S/S of infection  Outcome: Progressing  Goal: Oral mucous membranes remain intact  Outcome: Progressing     Problem: Musculoskeletal - Adult  Goal: Return mobility to safest level of function  Outcome: Progressing  Flowsheets (Taken 5/28/2024 1945)  Return Mobility to Safest Level of Function:   Assess patient stability and activity 
  Problem: Discharge Planning  Goal: Discharge to home or other facility with appropriate resources  Recent Flowsheet Documentation  Taken 5/25/2024 0800 by Alena Navarro RN  Discharge to home or other facility with appropriate resources:   Identify barriers to discharge with patient and caregiver   Identify discharge learning needs (meds, wound care, etc)   Refer to discharge planning if patient needs post-hospital services based on physician order or complex needs related to functional status, cognitive ability or social support system  5/25/2024 0214 by Theresa George RN  Outcome: Progressing  Flowsheets (Taken 5/24/2024 2041)  Discharge to home or other facility with appropriate resources: Identify barriers to discharge with patient and caregiver     Problem: Pain  Goal: Verbalizes/displays adequate comfort level or baseline comfort level  5/25/2024 1254 by Alena Navarro RN  Outcome: Progressing  Flowsheets (Taken 5/25/2024 1254)  Verbalizes/displays adequate comfort level or baseline comfort level:   Encourage patient to monitor pain and request assistance   Assess pain using appropriate pain scale   Administer analgesics based on type and severity of pain and evaluate response   Implement non-pharmacological measures as appropriate and evaluate response  5/25/2024 0214 by Theresa George RN  Outcome: Progressing     Problem: Safety - Adult  Goal: Free from fall injury  5/25/2024 1254 by Alena Navarro RN  Outcome: Progressing  Flowsheets (Taken 5/25/2024 1254)  Free From Fall Injury: Instruct family/caregiver on patient safety  5/25/2024 0214 by Theresa George RN  Outcome: Progressing     Problem: Skin/Tissue Integrity - Adult  Goal: Skin integrity remains intact  Outcome: Progressing  Flowsheets (Taken 5/25/2024 1254)  Skin Integrity Remains Intact: Monitor for areas of redness and/or skin breakdown  Goal: Incisions, wounds, or drain sites healing without S/S of infection  Outcome: 
Adult  Goal: Urinary catheter remains patent  5/29/2024 1623 by Belinda Henry RN  Outcome: Adequate for Discharge  5/29/2024 0932 by Isabelle Golden RN  Outcome: Progressing     Problem: Metabolic/Fluid and Electrolytes - Adult  Goal: Electrolytes maintained within normal limits  5/29/2024 1623 by Belinda Henry RN  Outcome: Adequate for Discharge  5/29/2024 0932 by Isabelle Golden RN  Outcome: Progressing  Goal: Hemodynamic stability and optimal renal function maintained  5/29/2024 1623 by Belinda Henry RN  Outcome: Adequate for Discharge  5/29/2024 0932 by Isabelle Golden RN  Outcome: Progressing  Goal: Glucose maintained within prescribed range  5/29/2024 1623 by Belinda Henry RN  Outcome: Adequate for Discharge  5/29/2024 0932 by Isabelle Golden RN  Outcome: Progressing     Problem: Chronic Conditions and Co-morbidities  Goal: Patient's chronic conditions and co-morbidity symptoms are monitored and maintained or improved  5/29/2024 1623 by Belinda Henry RN  Outcome: Adequate for Discharge  5/29/2024 0932 by Isabelle Golden RN  Outcome: Progressing     Problem: ABCDS Injury Assessment  Goal: Absence of physical injury  5/29/2024 1623 by Belinda Henry RN  Outcome: Adequate for Discharge  5/29/2024 0932 by Isabelle Golden RN  Outcome: Progressing     Problem: Skin/Tissue Integrity  Goal: Absence of new skin breakdown  Description: 1.  Monitor for areas of redness and/or skin breakdown  2.  Assess vascular access sites hourly  3.  Every 4-6 hours minimum:  Change oxygen saturation probe site  4.  Every 4-6 hours:  If on nasal continuous positive airway pressure, respiratory therapy assess nares and determine need for appliance change or resting period.  5/29/2024 1623 by Belinda Henry RN  Outcome: Adequate for Discharge  5/29/2024 0932 by Isabelle Golden RN  Outcome: Progressing     
Ensure adequate protection for wounds/incisions during mobilization  Goal: Maintain proper alignment of affected body part  Recent Flowsheet Documentation  Taken 5/26/2024 0027 by Lina Powell RN  Maintain proper alignment of affected body part:   Support and protect limb and body alignment per provider's orders   Instruct and reinforce with patient and family use of appropriate assistive device and precautions (e.g. spinal or hip dislocation precautions)  5/26/2024 0011 by Lina Powell RN  Outcome: Progressing  Flowsheets (Taken 5/25/2024 2007)  Maintain proper alignment of affected body part:   Support and protect limb and body alignment per provider's orders   Instruct and reinforce with patient and family use of appropriate assistive device and precautions (e.g. spinal or hip dislocation precautions)  Goal: Return ADL status to a safe level of function  Recent Flowsheet Documentation  Taken 5/26/2024 0027 by Lina Powell RN  Return ADL Status to a Safe Level of Function:   Administer medication as ordered   Assess activities of daily living deficits and provide assistive devices as needed   Obtain physical therapy/occupational therapy consults as needed  5/26/2024 0011 by Lina Powell RN  Outcome: Progressing  Flowsheets (Taken 5/25/2024 2007)  Return ADL Status to a Safe Level of Function: Obtain physical therapy/occupational therapy consults as needed     Problem: Genitourinary - Adult  Goal: Urinary catheter remains patent  Recent Flowsheet Documentation  Taken 5/26/2024 0027 by Lina Powell RN  Urinary catheter remains patent:   Assess patency of urinary catheter   Irrigate catheter per Licensed Independent Practitioner order if indicated and notify Licensed Independent Practitioner if unable to irrigate   Assess need for a larger catheter size or a 3-way catheter for continuous bladder irrigation  5/26/2024 0011 by Lina Powell RN  Outcome: Progressing     Problem: 
minimum:  Change oxygen saturation probe site  4.  Every 4-6 hours:  If on nasal continuous positive airway pressure, respiratory therapy assess nares and determine need for appliance change or resting period.  5/28/2024 0133 by Lina Powell, RN  Outcome: Progressing  5/27/2024 1848 by Anant Marie, RN  Outcome: Progressing

## 2024-05-30 ENCOUNTER — HOME CARE VISIT (OUTPATIENT)
Age: 69
End: 2024-05-30
Payer: MEDICARE

## 2024-05-30 ENCOUNTER — APPOINTMENT (OUTPATIENT)
Facility: HOSPITAL | Age: 69
End: 2024-05-30
Attending: EMERGENCY MEDICINE
Payer: MEDICARE

## 2024-05-30 VITALS
SYSTOLIC BLOOD PRESSURE: 152 MMHG | RESPIRATION RATE: 18 BRPM | TEMPERATURE: 98.6 F | OXYGEN SATURATION: 97 % | DIASTOLIC BLOOD PRESSURE: 61 MMHG | HEART RATE: 105 BPM

## 2024-05-30 LAB
ANION GAP SERPL CALC-SCNC: 7 MMOL/L (ref 3–18)
BUN SERPL-MCNC: 32 MG/DL (ref 7–18)
BUN/CREAT SERPL: 6 (ref 12–20)
CALCIUM SERPL-MCNC: 8.3 MG/DL (ref 8.5–10.1)
CHLORIDE SERPL-SCNC: 99 MMOL/L (ref 100–111)
CO2 SERPL-SCNC: 29 MMOL/L (ref 21–32)
CREAT SERPL-MCNC: 5.32 MG/DL (ref 0.6–1.3)
GLUCOSE SERPL-MCNC: 169 MG/DL (ref 74–99)
POTASSIUM SERPL-SCNC: 3.1 MMOL/L (ref 3.5–5.5)
SODIUM SERPL-SCNC: 135 MMOL/L (ref 136–145)

## 2024-05-30 PROCEDURE — G0299 HHS/HOSPICE OF RN EA 15 MIN: HCPCS

## 2024-05-30 PROCEDURE — 93971 EXTREMITY STUDY: CPT

## 2024-05-30 NOTE — ED PROVIDER NOTES
EMERGENCY DEPARTMENT HISTORY AND PHYSICAL EXAM    Date: 5/29/2024  Patient Name: Carmen Ledesma    History of Presenting Illness     No chief complaint on file.        History Provided By: Patient and EMS    Additional History (Context):   11:45 PM EDT  Carmen Ledesma is a 68 y.o. female with PMHX of hypertension, diabetes, kidney stones, end-stage renal disease on dialysis who presents to the emergency department C/O reported complications after surgery.  Patient with hip surgery on May 25.  They kept her inpatient all other conditions she was discharged today without reported significant problems.  She does dialysis PD however reports her port access was broken in the hospital.  She usually does home PD and has plans to visit the clinic in the morning to obtain new equipment.  Paramedics picked her up at her home found her resting comfortably with both feet elevated.  They report blood pressure elevated 168/89 tachycardic at 106.  She was transported to the ER to evaluate for possible DVT.    Social History  No smoking drinking or drugs    Family History  Mother with diabetes    PCP: Marie Ziegler MD    No current facility-administered medications for this encounter.     Current Outpatient Medications   Medication Sig Dispense Refill    atenolol (TENORMIN) 25 MG tablet Take 1 tablet by mouth daily 30 tablet 0    calcitRIOL (ROCALTROL) 0.25 MCG capsule Take 1 capsule by mouth daily 30 capsule 0    oxyCODONE-acetaminophen (PERCOCET) 5-325 MG per tablet Take 1 tablet by mouth every 8 hours as needed for Pain for up to 3 days. Intended supply: 3 days. Take lowest dose possible to manage pain Max Daily Amount: 3 tablets 8 tablet 0    epoetin lakeisha-epbx (RETACRIT) 87292 UNIT/ML SOLN injection Inject 1 mL into the skin Every Monday, Wednesday, and Friday With dialysis 1 mL 0    metoclopramide (REGLAN) 5 MG tablet Take 1 tablet by mouth 3 times daily (before meals) 120 tablet 0       Past History     Past  Medical History:  Past Medical History:   Diagnosis Date    CKD (chronic kidney disease)     DM (diabetes mellitus) (HCC)     HTN (hypertension)        Past Surgical History:  Past Surgical History:   Procedure Laterality Date    FEMUR SURGERY Right 5/26/2024    Right Hip Closed Reduction Fixation performed by Albino Rebolledo DO at [facility] MAIN OR    IR TUNNELED CATHETER PLACEMENT GREATER THAN 5 YEARS  12/5/2023    IR TUNNELED CATHETER PLACEMENT GREATER THAN 5 YEARS 12/5/2023 [facility] RAD ANGIO IR       Family History:  Family History   Problem Relation Age of Onset    Diabetes Mother        Social History:  Social History     Tobacco Use    Smoking status: Never   Substance Use Topics    Alcohol use: Never    Drug use: Never       Allergies:  Allergies   Allergen Reactions    Metformin And Related Other (See Comments)     \"Lactic acidosis\"         Physical Exam     Vitals:    05/30/24 0000 05/30/24 0015 05/30/24 0030 05/30/24 0045   BP: (!) 148/60 (!) 158/66 (!) 161/57 (!) 152/61   Pulse:       Resp:       Temp:       SpO2: 98% 95% 98% 97%     Physical Exam  Vitals and nursing note reviewed.   Constitutional:       Appearance: Normal appearance.   HENT:      Head: Normocephalic and atraumatic.      Ears:      Comments: No blood or fluid from ears or nose     Nose:      Comments: No blood or fluid from ears or nose     Mouth/Throat:      Mouth: Mucous membranes are moist.   Eyes:      Extraocular Movements: Extraocular movements intact.   Cardiovascular:      Rate and Rhythm: Normal rate and regular rhythm.      Pulses: Normal pulses.           Popliteal pulses are 2+ on the right side and 2+ on the left side.      Comments: There is swelling both lower extremities left side greater than right (right hip is surgical side).  Pulmonary:      Effort: Pulmonary effort is normal.      Breath sounds: Normal breath sounds.   Abdominal:      General: Abdomen is flat.      Palpations: Abdomen is soft.      Tenderness: There is no

## 2024-05-30 NOTE — ED TRIAGE NOTES
Pt to ed via ems stretcher with complaints of bilateral lower extremity swelling. Pt had right hip surgery on 05/25/2024, was released from the hospital today. States she had her full dialysis last night, typically does overnight home peritoneal dialysis that she was unable to complete due to port issues. Pt in NAD att. Hx diabetes, tachycardia.

## 2024-05-31 ENCOUNTER — HOME CARE VISIT (OUTPATIENT)
Age: 69
End: 2024-05-31
Payer: MEDICARE

## 2024-05-31 VITALS
DIASTOLIC BLOOD PRESSURE: 80 MMHG | SYSTOLIC BLOOD PRESSURE: 110 MMHG | TEMPERATURE: 97.6 F | RESPIRATION RATE: 18 BRPM | OXYGEN SATURATION: 97 % | HEART RATE: 76 BPM

## 2024-05-31 PROCEDURE — G0151 HHCP-SERV OF PT,EA 15 MIN: HCPCS

## 2024-05-31 NOTE — HOME HEALTH
Per discharge summary from inpt rehab:     PMHx: ESRD  DM  HTN  Anemia of CKD  S/p OR for hip fracture      SUBJECTIVE: pain on L hip 5/10, inability to walk   LIVING SITUATION/PLOF: Lives with daughter in a 1 level apartment, prior to surgery, she was independent with basic ADL's such as bathing and dressing and was using rollator for gait  CAREGIVER INVOLVEMENT/ ASSISTANCE NEEDED FOR: daughter for transportation, meal prep, S with mobility/ADL's  MEDICATIONS REVIEWED AND UPDATED:no change in medicine   NEXT MD APPOINTMENT: Dr. Albino Rebolledo 6/7/24    EQUIIPMENT: four wheeled walker   STRENGTH: L hip and knee flexor and extensor 3+/5, R hip and knee flexor and extensor 3-/5  WOUNDS: has intact mepilex dressing on R hip, no drainage noted   BED MOBILITY: Needed Min A to lift LLE in and out of bed   TRANSFERS: Max A with sit to stand transfers from recliner, Max A with bed and toilet transfers, unable to walk long distance and uses the seat of rollator so daughter can transport to bathroom, patient needs a wheelchair secondary to not being able to ambulate   GAIT:patient was only able to tolerate sit to stand fro recliner with Max A and 2 steps secondary to pain and weakness on RLE. Patient is unsafe to walk and will need a manual wheelchair for mobility.   STAIRS: n/a  BALANCE: Poor in standing balance, needed Max A to maintain standing balance and WB restriction on RLE     HEP consisting of:Patient was educated with ankle pumps, ankle circles, quad sets, heel slides, gluteal stes x 10 reps daily x 3 sets for purpose of increasing MMT and ROM on RLE to improve ability for transfers and gait. patient was educated with importance of using AD at all times, wearing proper footwear and maintaining clear mobility pathway to prevent LOB and falls,        PATIENT EDUCATION PROVIDED THIS VISIT: safety, HEP, deep breathing, home care plan and goals    PATIENT RESPONSE TO EDUCATION PROVIDED: Pt demonsatrated positive response to

## 2024-06-03 ASSESSMENT — ENCOUNTER SYMPTOMS: PAIN LOCATION - PAIN QUALITY: THROB

## 2024-06-03 NOTE — HOME HEALTH
education provided: patient and daughter verbalized understanding     Sharps Education Provided: Clinician instructed patient/CG on proper disposal of sharps: Containers should be made of hard plastic, be puncture-resistant and leakproof, such as a laundry detergent or bleach bottle.  When the container is ¾ full, it should be sealed with tape and labeled DO NOT RECYCLE prior to discarding in the regular trash.    Patient response to procedure performed:  NA    Home exercise program/Homework provided: using ice , propping her legs up    Functional Mobility:  Bed Mobility (rolling/scooting): Maximum Assistance (requires assistance with more than 50% of the effort)  Transfers (supine to chair and return to supine): Maximum Assistance (requires assistance with more than 50% of the effort).  Patient uses device: yes  Gait:  Ambulates with maximum assistance using a walker  Safety concerns with mobility include: unsteady gait, impaired balance , recent falls and pain interferes with activity  DME: walker     Pt/Caregiver instructed on plan of care and are agreeable to plan of care at this time.      Physician Albino Rebolledo DO notified of patient admission to home health and plan of care including anticipated frequency of SN and treatments/interventions/modalities of education.    Discharge planning discussed with patient and caregiver.  Discharge planning as follows: when goals met and education is complete.  Pt/Caregiver did verbalize understanding of discharge planning.     Next MD appointment 6/7/24 (date) with Albino Rebolledo DO MD/NP/PA.  Patient/caregiver encouraged/instructed to keep appointment as lack of follow through with physician appointment could result in discontinuation of home care services for non-compliance.

## 2024-06-04 ENCOUNTER — HOME CARE VISIT (OUTPATIENT)
Age: 69
End: 2024-06-04
Payer: MEDICARE

## 2024-06-04 VITALS
RESPIRATION RATE: 16 BRPM | TEMPERATURE: 97.7 F | HEART RATE: 80 BPM | OXYGEN SATURATION: 98 % | SYSTOLIC BLOOD PRESSURE: 128 MMHG | DIASTOLIC BLOOD PRESSURE: 72 MMHG

## 2024-06-04 PROCEDURE — G0299 HHS/HOSPICE OF RN EA 15 MIN: HCPCS

## 2024-06-05 ENCOUNTER — HOME CARE VISIT (OUTPATIENT)
Age: 69
End: 2024-06-05
Payer: MEDICARE

## 2024-06-05 VITALS
DIASTOLIC BLOOD PRESSURE: 70 MMHG | HEART RATE: 109 BPM | TEMPERATURE: 98.4 F | SYSTOLIC BLOOD PRESSURE: 136 MMHG | RESPIRATION RATE: 17 BRPM | OXYGEN SATURATION: 98 %

## 2024-06-05 PROCEDURE — G0157 HHC PT ASSISTANT EA 15: HCPCS

## 2024-06-05 ASSESSMENT — ENCOUNTER SYMPTOMS: PAIN LOCATION - PAIN QUALITY: SHARP

## 2024-06-06 VITALS
OXYGEN SATURATION: 95 % | TEMPERATURE: 98 F | RESPIRATION RATE: 16 BRPM | DIASTOLIC BLOOD PRESSURE: 88 MMHG | HEART RATE: 77 BPM | SYSTOLIC BLOOD PRESSURE: 132 MMHG

## 2024-06-06 ASSESSMENT — ENCOUNTER SYMPTOMS: DYSPNEA ACTIVITY LEVEL: AFTER AMBULATING 10 - 20 FT

## 2024-06-07 ENCOUNTER — HOME CARE VISIT (OUTPATIENT)
Age: 69
End: 2024-06-07
Payer: MEDICARE

## 2024-06-07 VITALS
OXYGEN SATURATION: 98 % | HEART RATE: 76 BPM | DIASTOLIC BLOOD PRESSURE: 88 MMHG | SYSTOLIC BLOOD PRESSURE: 160 MMHG | RESPIRATION RATE: 17 BRPM | TEMPERATURE: 97.8 F

## 2024-06-07 PROCEDURE — G0157 HHC PT ASSISTANT EA 15: HCPCS

## 2024-06-07 ASSESSMENT — ENCOUNTER SYMPTOMS: PAIN LOCATION - PAIN QUALITY: SORE

## 2024-06-07 NOTE — HOME HEALTH
SUBJECTIVE: Patient reports she is doing better. Her pain is improved.   CAREGIVER INVOLVEMENT/ASSISTANCE NEEDED FOR: daughter assists with IADLs as needed .   OBJECTIVE: See interventions.   PATIENT EDUCATION PROVIDED THIS VISIT: HEP program, gait training, transfer training  PATIENT RESPONSE TO EDUCATION PROVIDED: Patient verbalized understanding and return demonstration of techniques   PATIENT RESPONSE TO TREATMENT: Patient reports 4/10 ALVINA RPE transfers, 3/10 ALVINA RPE post ther ex, and 5/10 pain levels post treatment. .   ASSESSMENT OF PROGRESS TOWARD GOALS: Patient is making progress toward goals noted by improvements in gait distance to 4 feet. Patient is limited secondary to impaired balance, decreased strength, impaired mobility. Patient would benefit from continued skilled PT to improve strength, balance and improve independent mobility.   PLAN FOR NEXT VISIT: balance training, gait training.   THE FOLLOWING DISCHARGE PLANNING WAS DISCUSSED WITH THE PATIENT/CAREGIVER:  2w3 with discharge to self and MD following. Patient verbalized understanding and in agreement with discharge plan.

## 2024-06-11 ENCOUNTER — HOME CARE VISIT (OUTPATIENT)
Age: 69
End: 2024-06-11
Payer: MEDICARE

## 2024-06-12 NOTE — CASE COMMUNICATION
OT called to schedule visit for this day however no answer. OT left message however no return call. OT will attempt as schedule permits.

## 2024-06-12 NOTE — CASE COMMUNICATION
OT called to schedule visit for 6/4/24 however pt is not available this day. Will attempt as schedule permits.

## 2024-06-14 ENCOUNTER — HOME CARE VISIT (OUTPATIENT)
Age: 69
End: 2024-06-14
Payer: MEDICARE

## 2024-06-14 VITALS
OXYGEN SATURATION: 98 % | HEART RATE: 79 BPM | SYSTOLIC BLOOD PRESSURE: 124 MMHG | TEMPERATURE: 98.6 F | DIASTOLIC BLOOD PRESSURE: 78 MMHG | RESPIRATION RATE: 17 BRPM

## 2024-06-14 PROCEDURE — G0157 HHC PT ASSISTANT EA 15: HCPCS

## 2024-06-14 ASSESSMENT — ENCOUNTER SYMPTOMS: PAIN LOCATION - PAIN QUALITY: SORE

## 2024-06-14 NOTE — HOME HEALTH
SUBJECTIVE: Patient reports she is doing ok. Feeling sick and having a cough recently.  CAREGIVER INVOLVEMENT/ASSISTANCE NEEDED FOR: Dtr is present and assists with IADLs as needed .   OBJECTIVE: See interventions.   PATIENT EDUCATION PROVIDED THIS VISIT: gait training, HEP program, transfer training.   PATIENT RESPONSE TO EDUCATION PROVIDED: Patient verbalized understanding and return demonstration of techniques   PATIENT RESPONSE TO TREATMENT: Patient reports 3/10 ALVINA RPE post gait training, 3/10 ALVINA RPE post ther ex, and 0/10 pain levels post treatment. .   ASSESSMENT OF PROGRESS TOWARD GOALS: Patient is making progress toward noted by improvements in gait distance to 18 feet with FWW. Patient is limited secondary to impaired balance, decreased strength, impaired mobility. Patient would benefit from continued skilled PT to improve strength, balance and independent mobility.   PLAN FOR NEXT VISIT: gait training to bathroom in order to complete toilet training.   THE FOLLOWING DISCHARGE PLANNING WAS DISCUSSED WITH THE PATIENT/CAREGIVER: 2w1 with discharge to self and MD following. Patient verbalized understanding and in agreement with discharge plan.

## 2024-06-15 ENCOUNTER — HOME CARE VISIT (OUTPATIENT)
Age: 69
End: 2024-06-15
Payer: MEDICARE

## 2024-06-18 ENCOUNTER — HOME CARE VISIT (OUTPATIENT)
Age: 69
End: 2024-06-18
Payer: MEDICARE

## 2024-06-18 VITALS
HEART RATE: 85 BPM | DIASTOLIC BLOOD PRESSURE: 88 MMHG | SYSTOLIC BLOOD PRESSURE: 130 MMHG | OXYGEN SATURATION: 98 % | RESPIRATION RATE: 17 BRPM | TEMPERATURE: 97.8 F

## 2024-06-18 PROCEDURE — G0157 HHC PT ASSISTANT EA 15: HCPCS

## 2024-06-19 ENCOUNTER — HOME CARE VISIT (OUTPATIENT)
Age: 69
End: 2024-06-19
Payer: MEDICARE

## 2024-06-19 VITALS
DIASTOLIC BLOOD PRESSURE: 78 MMHG | HEART RATE: 110 BPM | TEMPERATURE: 99.1 F | SYSTOLIC BLOOD PRESSURE: 138 MMHG | OXYGEN SATURATION: 98 %

## 2024-06-19 PROCEDURE — G0299 HHS/HOSPICE OF RN EA 15 MIN: HCPCS

## 2024-06-19 NOTE — HOME HEALTH
SUBJECTIVE: Patient reports that she is doing pretty good. Reports walking to the bathroom but unable to walk back.   CAREGIVER INVOLVEMENT/ASSISTANCE NEEDED FOR: Dtr is present and assists with IADLs as needed .   OBJECTIVE: See interventions.   PATIENT EDUCATION PROVIDED THIS VISIT: gait training, HEP with resistance band  PATIENT RESPONSE TO EDUCATION PROVIDED: Patient verbalized understanding and return demonstration of techniques   PATIENT RESPONSE TO TREATMENT: Patient reports 3/10 ALVINA RPE post gait training, 3/10 ALVINA RPE post ther ex, and 0/10 pain levels post treatment. .   ASSESSMENT OF PROGRESS TOWARD GOALS: patient is making progress toward goals noted by improvements in gait distance to 34 feet with FWW, advanced HEP program to include light resistance band. Patient is limited secondary to pain, impaired balance, decreased strength. Patient would benefit from continued skilled PT to improve strength, balance and independent mobility.   PLAN FOR NEXT VISIT: balance training, gait training  THE FOLLOWING DISCHARGE PLANNING WAS DISCUSSED WITH THE PATIENT/CAREGIVER: 1w1, 2w1 with discharge to self and MD following. Patient verbalized understanding and in agreement with discharge plan.

## 2024-06-20 ENCOUNTER — HOME CARE VISIT (OUTPATIENT)
Age: 69
End: 2024-06-20
Payer: MEDICARE

## 2024-06-20 VITALS
SYSTOLIC BLOOD PRESSURE: 138 MMHG | RESPIRATION RATE: 18 BRPM | HEART RATE: 76 BPM | OXYGEN SATURATION: 98 % | TEMPERATURE: 98.4 F | DIASTOLIC BLOOD PRESSURE: 84 MMHG

## 2024-06-20 PROCEDURE — G0157 HHC PT ASSISTANT EA 15: HCPCS

## 2024-06-20 ASSESSMENT — ENCOUNTER SYMPTOMS: PAIN LOCATION - PAIN QUALITY: SORE

## 2024-06-20 NOTE — HOME HEALTH
Skilled reason for visit: education regarding medications and disease          Caregiver involvement: daughter lives with patient .         Medications reviewed and all medications are available in the home this visit.      The following education was provided regarding medications:  all meds are in the home and she is taking htem as ordered .      MD notified of any discrepancies/look a-like medications/medication interactions: none    Medications are effective at this time.           Home health supplies by type and quantity ordered/delivered this visit include: none         Patient education provided this visit: SN educated on s/s of infection, proppinge her leg up, cold medications and how it effects BP         Sharps education provided: Clinician instructed patient/CG on proper disposal of sharps: Containers should be made of hard plastic, be puncture-resistant and leakproof, such as a laundry detergent or bleach bottle.  When the container is ¾ full, it should be sealed with tape and labeled DO NOT RECYCLE prior to discarding in the regular trash.           Patient level of understanding of education provided: patient verbalized understanding         Patient response to procedure performed:  nA         Agency Progress toward goals: progressing          Patient's Progress towards personal goals: patient stated that she is ok         Home exercise program: deep breathing          Continued need for the following skills: Nursing.         Plan for next visit: education         Patient and/or caregiver notified and agrees to changes in the Plan of Care: N/A.          The following discharge planning was discussed with the pt/caregiver: when goals met and education is complete

## 2024-06-20 NOTE — HOME HEALTH
SUBJECTIVE: patient reports that she is is doing better. Was able to walk to the bathroom and back now.   CAREGIVER INVOLVEMENT/ASSISTANCE NEEDED FOR: Dtr is present and assists with IADLs as needed .   OBJECTIVE: See interventions.   PATIENT EDUCATION PROVIDED THIS VISIT: balance training, gait training   PATIENT RESPONSE TO EDUCATION PROVIDED: Patient verbalized understanding and return demonstration of techniques   PATIENT RESPONSE TO TREATMENT: Patient reports 3/10 ALVINA RPE post gait training, 3/10 ALVINA RPE post ther ex, and 0/10 pain levels post treatment. .   ASSESSMENT OF PROGRESS TOWARD GOALS: Patient is making progress toward goals noted by improvements in gait distance to 50 feet with FWW and SPV, advancement in balance as noted by completion of balance program. Patient is limited secondary to weakness, pain and impaired balance and mobilty. Patient would benefit from continued skilled PT to improve strength, balance and independent mobility.   PLAN FOR NEXT VISIT: HEP program, gait training, functional testing.   THE FOLLOWING DISCHARGE PLANNING WAS DISCUSSED WITH THE PATIENT/CAREGIVER: 2w1 with discharge to self and MD following. Patient verbalized understanding and in agreement with discharge plan
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
## 2024-06-24 ENCOUNTER — HOME CARE VISIT (OUTPATIENT)
Age: 69
End: 2024-06-24
Payer: MEDICARE

## 2024-06-24 VITALS
SYSTOLIC BLOOD PRESSURE: 130 MMHG | RESPIRATION RATE: 17 BRPM | DIASTOLIC BLOOD PRESSURE: 78 MMHG | OXYGEN SATURATION: 98 % | HEART RATE: 105 BPM | TEMPERATURE: 98 F

## 2024-06-24 PROCEDURE — G0157 HHC PT ASSISTANT EA 15: HCPCS

## 2024-06-24 NOTE — HOME HEALTH
SUBJECTIVE: Patient repors she is doing alright.   CAREGIVER INVOLVEMENT/ASSISTANCE NEEDED FOR: Dtr is present and assists with IADLs as needed .   OBJECTIVE: See interventions.   PATIENT EDUCATION PROVIDED THIS VISIT: HEP program  PATIENT RESPONSE TO EDUCATION PROVIDED: Patient verbalized understanding and return demonstration of techniques   PATIENT RESPONSE TO TREATMENT: Patient reports 3/10 ALVINA RPE post gait training, 3/10 ALVINA RPE post ther ex, and 0/10 pain levels post treatment. .   ASSESSMENT OF PROGRESS TOWARD GOALS:  Patient is making progress toward goals noted by improvements in gait distance to 44 feet with FWW and mod I, improvements in balance noted by TUG score of 31 seconds with FWW support and tinetti score of 17/28 indicatling high risk for falls, improvements in strength noted by FTSTS score of 32 seconds with UE support. Patient has demonstrated independence in mobility. No further rehab needs identified at this time.   PLAN FOR NEXT VISIT: discharged from skilled PT  THE FOLLOWING DISCHARGE PLANNING WAS DISCUSSED WITH THE PATIENT/CAREGIVER: 1w1 with discharge to self and MD following. Patient verbalized understanding and in agreement with discharge plan  Assessment and Summary of Care:  Patient's current functional status before discharge is as follows  Strength: FTSTS: 32 seconds with UE support  ROM:  WFL  Bed Mobility: independent  Transfers: mod I with all functional transfers including bed, toilet, shower, car.  Gait/WC mobility: 44 Feet with FWW and mod I   Stairs: NT, No steps needed to enter/exit home  Special Tests: TU seconds with FWW Tinetti: 17/28 indicating a high risk for falls  Recommendations: No further rehab needs identified at this time.

## 2024-06-28 ENCOUNTER — HOME CARE VISIT (OUTPATIENT)
Age: 69
End: 2024-06-28
Payer: MEDICARE

## 2024-07-02 ENCOUNTER — HOME CARE VISIT (OUTPATIENT)
Age: 69
End: 2024-07-02

## (undated) DEVICE — GARMENT,MEDLINE,DVT,INT,CALF,MED, GEN2: Brand: MEDLINE

## (undated) DEVICE — GLOVE ORANGE PI 8 1/2   MSG9085

## (undated) DEVICE — SOLUTION IRRIG 500ML 0.9% SOD CHLO USP POUR PLAS BTL

## (undated) DEVICE — GLOVE ORANGE PI 7   MSG9070

## (undated) DEVICE — PACK PROCEDURE SURG ANTR HIP

## (undated) DEVICE — GLOVE SURG SZ 85 L12IN FNGR ORTHO 126MIL CRM LTX FREE

## (undated) DEVICE — GLOVE SURG SZ 7 L12IN FNGR THK79MIL GRN LTX FREE

## (undated) DEVICE — APPLICATOR MEDICATED 26 CC SOLUTION HI LT ORNG CHLORAPREP

## (undated) DEVICE — Device

## (undated) DEVICE — SUTURE VICRYL + SZ 1 L36IN ABSRB UD L36MM CT-1 1/2 CIR VCP947H

## (undated) DEVICE — ADHESIVE SKIN CLOSURE WND 8.661X1.5 IN 22 CM LIQUIBAND SECUR

## (undated) DEVICE — DRESSING MEPILEX BORDER FLEX LITE 10X10CM

## (undated) DEVICE — DRESSING FOAM POST OPERATIVE 20X10 CM MEPLIX BORDER

## (undated) DEVICE — SUTURE VICRYL + SZ 2-0 L27IN ABSRB UD CT-2 L26MM 1/2 CIR TAPR VCP269H